# Patient Record
Sex: MALE | Race: WHITE | NOT HISPANIC OR LATINO | Employment: UNEMPLOYED | URBAN - NONMETROPOLITAN AREA
[De-identification: names, ages, dates, MRNs, and addresses within clinical notes are randomized per-mention and may not be internally consistent; named-entity substitution may affect disease eponyms.]

---

## 2017-01-08 ENCOUNTER — APPOINTMENT (OUTPATIENT)
Dept: CT IMAGING | Facility: HOSPITAL | Age: 52
End: 2017-01-08

## 2017-01-08 ENCOUNTER — HOSPITAL ENCOUNTER (EMERGENCY)
Facility: HOSPITAL | Age: 52
Discharge: HOME | End: 2017-01-08
Admitting: EMERGENCY MEDICINE

## 2017-01-08 ENCOUNTER — APPOINTMENT (OUTPATIENT)
Dept: GENERAL RADIOLOGY | Facility: HOSPITAL | Age: 52
End: 2017-01-08

## 2017-01-08 VITALS
OXYGEN SATURATION: 96 % | SYSTOLIC BLOOD PRESSURE: 101 MMHG | HEART RATE: 64 BPM | BODY MASS INDEX: 30.12 KG/M2 | HEIGHT: 63 IN | DIASTOLIC BLOOD PRESSURE: 51 MMHG | WEIGHT: 170 LBS | TEMPERATURE: 97 F | RESPIRATION RATE: 23 BRPM

## 2017-01-08 DIAGNOSIS — R42 DIZZINESS: Primary | ICD-10-CM

## 2017-01-08 DIAGNOSIS — G23.8 FAHR'S SYNDROME (HCC): ICD-10-CM

## 2017-01-08 LAB
ALBUMIN SERPL-MCNC: 4 G/DL (ref 3.5–5)
ALBUMIN/GLOB SERPL: 1.3 G/DL (ref 1.1–2.5)
ALP SERPL-CCNC: 87 U/L (ref 24–120)
ALT SERPL W P-5'-P-CCNC: 85 U/L (ref 0–54)
AMYLASE SERPL-CCNC: 54 U/L (ref 30–110)
ANION GAP SERPL CALCULATED.3IONS-SCNC: 14 MMOL/L (ref 4–13)
APTT PPP: 29.1 SECONDS (ref 24.1–34.8)
AST SERPL-CCNC: 51 U/L (ref 7–45)
BASOPHILS # BLD AUTO: 0.02 10*3/MM3 (ref 0–0.2)
BASOPHILS NFR BLD AUTO: 0.4 % (ref 0–2)
BILIRUB SERPL-MCNC: 0.6 MG/DL (ref 0.1–1)
BILIRUB UR QL STRIP: NEGATIVE
BUN BLD-MCNC: 14 MG/DL (ref 5–21)
BUN/CREAT SERPL: 17.5 (ref 7–25)
CALCIUM SPEC-SCNC: 7.9 MG/DL (ref 8.4–10.4)
CHLORIDE SERPL-SCNC: 102 MMOL/L (ref 98–110)
CLARITY UR: CLEAR
CO2 SERPL-SCNC: 26 MMOL/L (ref 24–31)
COLOR UR: YELLOW
CREAT BLD-MCNC: 0.8 MG/DL (ref 0.5–1.4)
DEPRECATED RDW RBC AUTO: 42.3 FL (ref 40–54)
EOSINOPHIL # BLD AUTO: 0.11 10*3/MM3 (ref 0–0.7)
EOSINOPHIL NFR BLD AUTO: 2.4 % (ref 0–4)
ERYTHROCYTE [DISTWIDTH] IN BLOOD BY AUTOMATED COUNT: 12.8 % (ref 12–15)
GFR SERPL CREATININE-BSD FRML MDRD: 103 ML/MIN/1.73
GLOBULIN UR ELPH-MCNC: 3.2 GM/DL
GLUCOSE BLD-MCNC: 87 MG/DL (ref 70–100)
GLUCOSE UR STRIP-MCNC: NEGATIVE MG/DL
HCT VFR BLD AUTO: 43.3 % (ref 40–52)
HGB BLD-MCNC: 15.2 G/DL (ref 14–18)
HGB UR QL STRIP.AUTO: NEGATIVE
HOLD SPECIMEN: NORMAL
HOLD SPECIMEN: NORMAL
IMM GRANULOCYTES # BLD: 0.01 10*3/MM3 (ref 0–0.03)
IMM GRANULOCYTES NFR BLD: 0.2 % (ref 0–5)
INR PPP: 0.9 (ref 0.91–1.09)
KETONES UR QL STRIP: NEGATIVE
LEUKOCYTE ESTERASE UR QL STRIP.AUTO: NEGATIVE
LIPASE SERPL-CCNC: 43 U/L (ref 23–203)
LYMPHOCYTES # BLD AUTO: 1.6 10*3/MM3 (ref 0.72–4.86)
LYMPHOCYTES NFR BLD AUTO: 35 % (ref 15–45)
MCH RBC QN AUTO: 31.8 PG (ref 28–32)
MCHC RBC AUTO-ENTMCNC: 35.1 G/DL (ref 33–36)
MCV RBC AUTO: 90.6 FL (ref 82–95)
MONOCYTES # BLD AUTO: 0.58 10*3/MM3 (ref 0.19–1.3)
MONOCYTES NFR BLD AUTO: 12.7 % (ref 4–12)
NEUTROPHILS # BLD AUTO: 2.25 10*3/MM3 (ref 1.87–8.4)
NEUTROPHILS NFR BLD AUTO: 49.3 % (ref 39–78)
NITRITE UR QL STRIP: NEGATIVE
PH UR STRIP.AUTO: 7 [PH] (ref 5–8)
PLATELET # BLD AUTO: 146 10*3/MM3 (ref 130–400)
PMV BLD AUTO: 11.8 FL (ref 6–12)
POTASSIUM BLD-SCNC: 3.7 MMOL/L (ref 3.5–5.3)
PROT SERPL-MCNC: 7.2 G/DL (ref 6.3–8.7)
PROT UR QL STRIP: NEGATIVE
PROTHROMBIN TIME: 12.4 SECONDS (ref 11.9–14.6)
RBC # BLD AUTO: 4.78 10*6/MM3 (ref 4.8–5.9)
SODIUM BLD-SCNC: 142 MMOL/L (ref 135–145)
SP GR UR STRIP: 1.01 (ref 1–1.03)
TROPONIN I SERPL-MCNC: 0 NG/ML (ref 0–0.07)
UROBILINOGEN UR QL STRIP: NORMAL
WBC NRBC COR # BLD: 4.57 10*3/MM3 (ref 4.8–10.8)
WHOLE BLOOD HOLD SPECIMEN: NORMAL
WHOLE BLOOD HOLD SPECIMEN: NORMAL

## 2017-01-08 PROCEDURE — 84484 ASSAY OF TROPONIN QUANT: CPT

## 2017-01-08 PROCEDURE — 80053 COMPREHEN METABOLIC PANEL: CPT | Performed by: NURSE PRACTITIONER

## 2017-01-08 PROCEDURE — 93005 ELECTROCARDIOGRAM TRACING: CPT | Performed by: NURSE PRACTITIONER

## 2017-01-08 PROCEDURE — 81003 URINALYSIS AUTO W/O SCOPE: CPT | Performed by: NURSE PRACTITIONER

## 2017-01-08 PROCEDURE — 96376 TX/PRO/DX INJ SAME DRUG ADON: CPT

## 2017-01-08 PROCEDURE — 96375 TX/PRO/DX INJ NEW DRUG ADDON: CPT

## 2017-01-08 PROCEDURE — 93010 ELECTROCARDIOGRAM REPORT: CPT | Performed by: INTERNAL MEDICINE

## 2017-01-08 PROCEDURE — 85025 COMPLETE CBC W/AUTO DIFF WBC: CPT | Performed by: NURSE PRACTITIONER

## 2017-01-08 PROCEDURE — 25010000002 METHYLPREDNISOLONE PER 125 MG: Performed by: NURSE PRACTITIONER

## 2017-01-08 PROCEDURE — 99284 EMERGENCY DEPT VISIT MOD MDM: CPT

## 2017-01-08 PROCEDURE — 96361 HYDRATE IV INFUSION ADD-ON: CPT

## 2017-01-08 PROCEDURE — 70450 CT HEAD/BRAIN W/O DYE: CPT

## 2017-01-08 PROCEDURE — 85730 THROMBOPLASTIN TIME PARTIAL: CPT | Performed by: NURSE PRACTITIONER

## 2017-01-08 PROCEDURE — 96374 THER/PROPH/DIAG INJ IV PUSH: CPT

## 2017-01-08 PROCEDURE — 82150 ASSAY OF AMYLASE: CPT | Performed by: NURSE PRACTITIONER

## 2017-01-08 PROCEDURE — 25010000002 ONDANSETRON PER 1 MG: Performed by: NURSE PRACTITIONER

## 2017-01-08 PROCEDURE — 85610 PROTHROMBIN TIME: CPT | Performed by: NURSE PRACTITIONER

## 2017-01-08 PROCEDURE — 83690 ASSAY OF LIPASE: CPT | Performed by: NURSE PRACTITIONER

## 2017-01-08 PROCEDURE — 71010 HC CHEST PA OR AP: CPT

## 2017-01-08 RX ORDER — ONDANSETRON 2 MG/ML
4 INJECTION INTRAMUSCULAR; INTRAVENOUS ONCE
Status: COMPLETED | OUTPATIENT
Start: 2017-01-08 | End: 2017-01-08

## 2017-01-08 RX ORDER — METHYLPREDNISOLONE SODIUM SUCCINATE 125 MG/2ML
125 INJECTION, POWDER, LYOPHILIZED, FOR SOLUTION INTRAMUSCULAR; INTRAVENOUS ONCE
Status: COMPLETED | OUTPATIENT
Start: 2017-01-08 | End: 2017-01-08

## 2017-01-08 RX ORDER — CALCITRIOL 0.25 UG/1
CAPSULE, LIQUID FILLED ORAL
COMMUNITY

## 2017-01-08 RX ORDER — ASPIRIN 81 MG/1
TABLET ORAL
COMMUNITY
Start: 2016-06-29 | End: 2017-03-29

## 2017-01-08 RX ORDER — MECLIZINE HYDROCHLORIDE 25 MG/1
25 TABLET ORAL ONCE
Status: COMPLETED | OUTPATIENT
Start: 2017-01-08 | End: 2017-01-08

## 2017-01-08 RX ORDER — OMEPRAZOLE 20 MG/1
CAPSULE, DELAYED RELEASE ORAL
COMMUNITY

## 2017-01-08 RX ORDER — CHLORDIAZEPOXIDE HYDROCHLORIDE 25 MG/1
25 CAPSULE, GELATIN COATED ORAL ONCE
Status: DISCONTINUED | OUTPATIENT
Start: 2017-01-08 | End: 2017-01-08

## 2017-01-08 RX ADMIN — ONDANSETRON HYDROCHLORIDE 4 MG: 2 SOLUTION INTRAMUSCULAR; INTRAVENOUS at 12:27

## 2017-01-08 RX ADMIN — ONDANSETRON HYDROCHLORIDE 4 MG: 2 SOLUTION INTRAMUSCULAR; INTRAVENOUS at 10:52

## 2017-01-08 RX ADMIN — MECLIZINE HYDROCHLORIDE 25 MG: 25 TABLET ORAL at 12:27

## 2017-01-08 RX ADMIN — SODIUM CHLORIDE 1000 ML: 9 INJECTION, SOLUTION INTRAVENOUS at 10:51

## 2017-01-08 RX ADMIN — METHYLPREDNISOLONE SODIUM SUCCINATE 125 MG: 125 INJECTION, POWDER, FOR SOLUTION INTRAMUSCULAR; INTRAVENOUS at 12:25

## 2017-01-08 NOTE — ED NOTES
Pt reports that he becomes more dizzy and nauseated with any movement- especially when going from lying to sitting and standing to sitting.      Bernardino Desouza RN  01/08/17 1559

## 2017-01-08 NOTE — ED PROVIDER NOTES
Subjective   HPI Comments: She is a 51-year-old white male presents with nausea, vomiting, and dizziness for the past 3 days.  He denies any headache or chest pain.  He states that the last time he vomited was just pta    Patient is a 51 y.o. male presenting with dizziness.   History provided by:  Patient   used: No    Dizziness       Review of Systems   Constitutional: Negative.    HENT: Negative.    Eyes: Negative.    Respiratory: Negative.    Cardiovascular: Negative.    Gastrointestinal: Negative.    Endocrine: Negative.    Genitourinary: Negative.    Musculoskeletal: Negative.    Skin: Negative.    Allergic/Immunologic: Negative.    Neurological: Positive for dizziness.        Pt states that he has had dizziness with nausea and vomiting for the past 3 days. He denies headache. He denies chest pain. No fever or chills. He states that the dizziness is not worsened with movement of head. Denies any numbness or focal weakness   Hematological: Negative.    Psychiatric/Behavioral: Negative.    All other systems reviewed and are negative.      Past Medical History   Diagnosis Date   • Fahr's disease    • GERD (gastroesophageal reflux disease)        Allergies   Allergen Reactions   • Bactrim [Sulfamethoxazole-Trimethoprim]        Past Surgical History   Procedure Laterality Date   • Foot surgery         History reviewed. No pertinent family history.    Social History     Social History   • Marital status: Single     Spouse name: N/A   • Number of children: N/A   • Years of education: N/A     Social History Main Topics   • Smoking status: Current Some Day Smoker     Types: Cigars   • Smokeless tobacco: None   • Alcohol use No   • Drug use: No   • Sexual activity: Not Asked     Other Topics Concern   • None     Social History Narrative   • None       Prior to Admission medications    Medication Sig Start Date End Date Taking? Authorizing Provider   aspirin 81 MG EC tablet Take 1 tablet by mouth daily  "6/29/16  Yes Historical Provider, MD   calcitriol (ROCALTROL) 0.25 MCG capsule Take 0.25 mcg by mouth three times daily     Historical Provider, MD   omeprazole (priLOSEC) 20 MG capsule Take 20 mg by mouth daily    Historical Provider, MD       Visit Vitals   • /51 (BP Location: Left arm, Patient Position: Lying)   • Pulse 64   • Temp 97 °F (36.1 °C) (Oral)   • Resp 23   • Ht 63\" (160 cm)   • Wt 170 lb (77.1 kg)   • SpO2 96%   • BMI 30.11 kg/m2       Objective   Physical Exam   Constitutional: He is oriented to person, place, and time. He appears well-developed and well-nourished.   HENT:   Head: Normocephalic and atraumatic.   Right Ear: External ear normal.   Left Ear: External ear normal.   Nose: Nose normal.   Mouth/Throat: Oropharynx is clear and moist.   Eyes: Conjunctivae and EOM are normal. Pupils are equal, round, and reactive to light.   Neck: Normal range of motion. Neck supple.   Cardiovascular: Normal rate, regular rhythm, normal heart sounds and intact distal pulses.    Pulmonary/Chest: Effort normal and breath sounds normal.   Abdominal: Soft. Bowel sounds are normal.   Musculoskeletal: Normal range of motion.   Neurological: He is alert and oriented to person, place, and time. He has normal reflexes.   Mild nystagmus with vertical gaze to the right. Has involuntary movements to upper extremities. Pt states that this is from fahrs disease    Skin: Skin is warm and dry.   Psychiatric: He has a normal mood and affect. His behavior is normal. Judgment and thought content normal.   Nursing note and vitals reviewed.      Procedures         Lab Results (last 24 hours)     Procedure Component Value Units Date/Time    CBC & Differential [[id number]] Collected:  01/08/17 1051    Specimen:  Blood Updated:  01/08/17 1114    Narrative:       The following orders were created for panel order CBC & Differential.  Procedure                               Abnormality         Status                     ---------    "                            -----------         ------                     Scan Slide[[id number]]                                                                   CBC Auto Differential[[id number]]         Abnormal            Final result                 Please view results for these tests on the individual orders.    Comprehensive Metabolic Panel [[id number]]  (Abnormal) Collected:  01/08/17 1051    Specimen:  Blood Updated:  01/08/17 1114     Glucose 87 mg/dL      BUN 14 mg/dL      Creatinine 0.80 mg/dL      Sodium 142 mmol/L      Potassium 3.7 mmol/L      Chloride 102 mmol/L      CO2 26.0 mmol/L      Calcium 7.9 (L) mg/dL      Total Protein 7.2 g/dL      Albumin 4.00 g/dL      ALT (SGPT) 85 (H) U/L      AST (SGOT) 51 (H) U/L      Alkaline Phosphatase 87 U/L      Total Bilirubin 0.6 mg/dL      eGFR Non African Amer 103 mL/min/1.73      Globulin 3.2 gm/dL      A/G Ratio 1.3 g/dL      BUN/Creatinine Ratio 17.5      Anion Gap 14.0 (H) mmol/L     Protime-INR [[id number]]  (Abnormal) Collected:  01/08/17 1051    Specimen:  Blood Updated:  01/08/17 1112     Protime 12.4 Seconds      INR 0.90 (L)     aPTT [[id number]]  (Normal) Collected:  01/08/17 1051    Specimen:  Blood Updated:  01/08/17 1112     PTT 29.1 seconds     Amylase [[id number]]  (Normal) Collected:  01/08/17 1051    Specimen:  Blood Updated:  01/08/17 1114     Amylase 54 U/L     Lipase [[id number]]  (Normal) Collected:  01/08/17 1051    Specimen:  Blood Updated:  01/08/17 1114     Lipase 43 U/L     CBC Auto Differential [[id number]]  (Abnormal) Collected:  01/08/17 1051    Specimen:  Blood Updated:  01/08/17 1114     WBC 4.57 (L) 10*3/mm3      RBC 4.78 (L) 10*6/mm3      Hemoglobin 15.2 g/dL      Hematocrit 43.3 %      MCV 90.6 fL      MCH 31.8 pg      MCHC 35.1 g/dL      RDW 12.8 %      RDW-SD 42.3 fl      MPV 11.8 fL      Platelets 146 10*3/mm3      Neutrophil % 49.3 %      Lymphocyte % 35.0 %      Monocyte % 12.7 (H) %      Eosinophil % 2.4 %      Basophil % 0.4 %       Immature Grans % 0.2 %      Neutrophils, Absolute 2.25 10*3/mm3      Lymphocytes, Absolute 1.60 10*3/mm3      Monocytes, Absolute 0.58 10*3/mm3      Eosinophils, Absolute 0.11 10*3/mm3      Basophils, Absolute 0.02 10*3/mm3      Immature Grans, Absolute 0.01 10*3/mm3     POC Troponin, Rapid [[id number]]  (Normal) Collected:  01/08/17 1100    Specimen:  Blood Updated:  01/08/17 1115     Troponin I 0.00 ng/mL       Serial Number: [id number]    : [id number]       Urinalysis With / Culture If Indicated [[id number]]  (Normal) Collected:  01/08/17 1127    Specimen:  Urine from Urine, Clean Catch Updated:  01/08/17 1218     Color, UA Yellow      Appearance, UA Clear      pH, UA 7.0      Specific Gravity, UA 1.013      Glucose, UA Negative      Ketones, UA Negative      Bilirubin, UA Negative      Blood, UA Negative      Protein, UA Negative      Leuk Esterase, UA Negative      Nitrite, UA Negative      Urobilinogen, UA 1.0 E.U./dL     Narrative:       Urine microscopic not indicated.          CT Head Without Contrast   ED Interpretation   1. No acute intracranial process.   2. Marked cerebral and cerebellar calcifications in a fairly symmetric   pattern. While this may be related to prior insult or infection,   consider  metabolic causes such as hypoparathyroidism,   pseudohypoparathyroidism, and Fahr disease. Clinical correlation   recommended.           This report was finalized on 01/08/2017 11:26 by Dr. Elias Coy MD.      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   This report was finalized on 01/08/2017 11:59 by Dr. Elias Coy MD.      Final   1. No acute intracranial process.   2. Marked cerebral and cerebellar calcifications in a fairly symmetric   pattern. While this may be related to prior insult or infection,   consider  metabolic causes such as hypoparathyroidism,   pseudohypoparathyroidism, and Fahr disease. Clinical correlation   recommended.           This  report was finalized on 01/08/2017 11:26 by Dr. Elias Coy MD.      XR Chest 1 View   ED Interpretation   Impression: No acute findings.   This report was finalized on 01/08/2017 10:38 by Dr. Elias Coy MD.      Final Result   Impression: No acute findings.   This report was finalized on 01/08/2017 10:38 by Dr. Elias Coy MD.          ED Course  ED Course   Comment By Time   Reviewed pt and pt care plan with dr carty- also assessed pt and in agreement with pt care plan. Dr carty advised pt that he needs to follow up with neurology for further. Pt states that he is feeling some better at this time. Will be discharged home shortly in stable condition  Ritu Garcia, APRN 01/08 1302          MDM  Number of Diagnoses or Management Options  Dizziness: minor  Fahr's syndrome: established and worsening     Amount and/or Complexity of Data Reviewed  Clinical lab tests: ordered and reviewed  Tests in the radiology section of CPT®: ordered and reviewed  Independent visualization of images, tracings, or specimens: yes    Risk of Complications, Morbidity, and/or Mortality  Presenting problems: minimal  Diagnostic procedures: minimal  Management options: minimal    Patient Progress  Patient progress: stable      Final diagnoses:   Dizziness   Fahr's syndrome          Ritu Garcia, SKYE  01/08/17 6610

## 2017-03-24 ENCOUNTER — HOSPITAL ENCOUNTER (EMERGENCY)
Facility: HOSPITAL | Age: 52
Discharge: LEFT BEFORE DISCHARGE | End: 2017-03-24
Payer: COMMERCIAL

## 2017-03-29 ENCOUNTER — OFFICE VISIT (OUTPATIENT)
Dept: PODIATRY | Facility: CLINIC | Age: 52
End: 2017-03-29

## 2017-03-29 VITALS
BODY MASS INDEX: 27.83 KG/M2 | WEIGHT: 163 LBS | DIASTOLIC BLOOD PRESSURE: 78 MMHG | HEART RATE: 78 BPM | HEIGHT: 64 IN | SYSTOLIC BLOOD PRESSURE: 118 MMHG

## 2017-03-29 DIAGNOSIS — M79.671 FOOT PAIN, RIGHT: ICD-10-CM

## 2017-03-29 DIAGNOSIS — L84 CALLUS OF FOOT: ICD-10-CM

## 2017-03-29 DIAGNOSIS — L60.0 INGROWN RIGHT BIG TOENAIL: Primary | ICD-10-CM

## 2017-03-29 PROCEDURE — 99203 OFFICE O/P NEW LOW 30 MIN: CPT | Performed by: PODIATRIST

## 2017-03-29 PROCEDURE — 11720 DEBRIDE NAIL 1-5: CPT | Performed by: PODIATRIST

## 2017-03-29 PROCEDURE — 11055 PARING/CUTG B9 HYPRKER LES 1: CPT | Performed by: PODIATRIST

## 2017-03-29 NOTE — PROGRESS NOTES
[facility] - PODIATRY    Today's Date: 03/29/2017    Patient Name: Jaime Taveras  MRN: [id number]  CSN: [id number]  PCP: Juan J Mccormick Jr., MD  Referring Provider: No ref. provider found    SUBJECTIVE     Chief Complaint   Patient presents with   • Nail Problem     Ingrown Nail R great toe/Ref Kareem Weems APRN/antibiotic given for this makes him sick so he isn't taking it     HPI: Jaime Taveras, a 51 y.o.male, comes to clinic as a(n) new patient complaining of ingrown toenail and painful callus. States that 6-9 months ago he stubbed his toe while getting up in the middle of the night. Since that time the toenail became dark and loose but never came off. The past couple of weeks it has become more painful and noticed occasional drainage from the nail. He was given abx but did not take them bc they upset his stomach. Admits pain at 7/10 level, described as aching and throbbing and centered around right great toe. Also relates having a painful callus on his right 5th toe. Denies any constitutional symptoms. No other pedal complaints at this time.    Past Medical History:   Diagnosis Date   • CAD (coronary artery disease)    • COPD (chronic obstructive pulmonary disease)    • Degenerative joint disease    • Fahr's disease    • GERD (gastroesophageal reflux disease)    • Hepatitis C    • Infectious viral hepatitis     Hepatitis C   • Pseudohypoparathyroidism    • Psoriasis      Past Surgical History:   Procedure Laterality Date   • FOOT SURGERY     • FRACTURE SURGERY Right     foot     Family History   Problem Relation Age of Onset   • Alcohol abuse Mother    • Cirrhosis Father    • Hepatitis Father    • Alcohol abuse Father    • Alcohol abuse Other      Social History     Social History   • Marital status: Single     Spouse name: N/A   • Number of children: N/A   • Years of education: N/A     Occupational History   • Not on file.     Social History Main Topics   • Smoking status: Current  Some Day Smoker     Types: Cigars   • Smokeless tobacco: Former User   • Alcohol use No      Comment: Sober for 18 months   • Drug use: No   • Sexual activity: Defer     Other Topics Concern   • Not on file     Social History Narrative     Allergies   Allergen Reactions   • Bactrim [Sulfamethoxazole-Trimethoprim]    • Cephalexin      Nausea/Diarrhea     Current Outpatient Prescriptions   Medication Sig Dispense Refill   • calcitriol (ROCALTROL) 0.25 MCG capsule Take 0.25 mcg by mouth three times daily      • calcium carbonate (TUMS) 500 MG chewable tablet Chew 1 tablet Daily.     • clobetasol (TEMOVATE) 0.05 % ointment Apply  topically 2 (Two) Times a Day.     • diclofenac (VOLTAREN) 50 MG EC tablet      • ibuprofen (ADVIL,MOTRIN) 800 MG tablet      • omeprazole (priLOSEC) 20 MG capsule Take 20 mg by mouth daily     • VENTOLIN  (90 BASE) MCG/ACT inhaler        No current facility-administered medications for this visit.      Review of Systems   Constitutional: Negative for chills and fever.   HENT: Negative for congestion.    Respiratory: Negative for shortness of breath.    Cardiovascular: Negative for chest pain and leg swelling.   Gastrointestinal: Negative for constipation, diarrhea, nausea and vomiting.   Skin:        Ingrown toenail   Neurological: Negative for numbness.       OBJECTIVE     Vitals:    03/29/17 1450   BP: 118/78   Pulse: 78       PHYSICAL EXAM  GEN:   A&Ox3, NAD. Pt presents to clinic ambulating without assistance and wearing Casual Shoes.      NEURO:   Protective sensation intact to 10/10 sites Right foot, 10/10 site Left foot using [place] monofilament  Light touch sensation present  No Tinel's or Villeux sign.    VASC:  Skin temperature Warm to Warm proximal to distal shayla  DP pulses 2/4 Right, 2/4 Left  PT pulses 2/4 Right, 2/4 Left  CFT <3 sec shayla  Pedal hair growth present  no edema noted shayla  Varicosities absent shayla    MUSC/SKEL:  Muscle Strength Right foot Dorsiflexors  5/5, Plantarflexors 5/5, Evertors 5/5, Invertors 5/5  Muscle Strength Left foot Dorsiflexors 5/5, Plantarflexors 5/5, Evertors 5/5, Invertors 5/5  ROM of the 1st MTP is full without pain or crepitus  ROM of the MTJ is full without pain or crepitus    ROM of the STJ is full without pain or crepitus    ROM of the ankle joint is full without pain or crepitus    POP of right hallux and distal right 5th toe  Rectus foot type   Gait pattern: Normal  No gross pedal musculoskeletal deformities noted.     DERM:  Pedal nails x10 are thickened, elongated and discolored with presence of subunugual debris and right hallux nail incurvated at the lateral border, offending the nail fold and causing erythema and pain with pinpoint wound, and becoming detached from the nail bed  Webspaces are Clean, Dry, and Intact  Skin is normal in turgor and texture with no open wounds or sores appreciated.  Nucleated HPK noted to distal lateral plantar aspect of right 5th digit      RADIOLOGY/NUCLEAR:  No results found.    LABORATORY/CULTURE RESULTS:      PATHOLOGY RESULTS:       ASSESSMENT/PLAN     Jaime was seen today for nail problem.    Diagnoses and all orders for this visit:    Ingrown right big toenail    Foot pain, right    Callus of foot      Comprehensive lower extremity examination and evaluation was performed.  Discussed findings and treatment plan including risks, benefits, and treatment options with patient in detail. Patient agreed with treatment plan.  After verbal consent obtained, nail(s) x1 debrided of offending borders with nail nipper without incidence, After verbal consent obtained, calluses x1 pared utilizing dermal curette and/or scalpel without incidence, Patient may maintain nails and calluses at home utilizing emery board of pumice stone between visits as needed.  Applied polysporin and bandaid   An After Visit Summary was printed and given to the patient at discharge, including (if requested) any available  informative/educational handouts regarding diagnosis, treatment, or medications. All questions were answered to patient/family satisfaction. Should symptoms fail to improve or worsen they agree to call or return to clinic or to go to the Emergency Department. Discussed the importance of following up with any needed screening tests/labs/specialist appointments and any requested follow-up recommended by me today. Importance of maintaining follow-up discussed and patient accepts that missed appointments can delay diagnosis and potentially lead to worsening of conditions.  Return if symptoms worsen or fail to improve., or sooner if acute issues arise.        This document has been electronically signed by Donte Sandra DPM on March 29, 2017 3:44 PM     Please note that portions of this note may have been completed with a voice recognition program. Efforts were made to edit the dictations, but occasionally words are mistranscribed.

## 2017-03-29 NOTE — PATIENT INSTRUCTIONS
Corns and Calluses  Corns are small areas of thickened skin that occur on the top, sides, or tip of a toe. They contain a cone-shaped core with a point that can press on a nerve below. This causes pain. Calluses are areas of thickened skin that can occur anywhere on the body including hands, fingers, palms, soles of the feet, and heels. Calluses are usually larger than corns.   CAUSES   Corns and calluses are caused by rubbing (friction) or pressure, such as from shoes that are too tight or do not fit properly.   RISK FACTORS  Corns are more likely to develop in people who have toe deformities, such as hammer toes.  Since calluses can occur with friction to any area of the skin, calluses are more likely to develop in people who:   · Work with their hands.  · Wear shoes that fit poorly, shoes that are too tight, or shoes that are high-heeled.  · Have toes deformities.  SYMPTOMS  Symptoms of a corn or callus include:  · A hard growth on the skin.    · Pain or tenderness under the skin.    · Redness and swelling.    · Increased discomfort while wearing tight-fitting shoes.  DIAGNOSIS   Corns and calluses may be diagnosed with a medical history and physical exam.   TREATMENT   Corns and calluses may be treated with:  · Removing the cause of the friction or pressure. This may include:    Changing your shoes.    Wearing shoe inserts (orthotics) or other protective layers in your shoes, such as a corn pad.    Wearing gloves.  · Medicines to help soften skin in the hardened, thickened areas.  · Reducing the size of the corn or callus by removing the dead layers of skin.  · Antibiotic medicines to treat infection.  · Surgery, if a toe deformity is the cause.  HOME CARE INSTRUCTIONS   · Take medicines only as directed by your health care provider.  · If you were prescribed an antibiotic, finish all of it even if you start to feel better.  · Wear shoes that fit well. Avoid wearing high-heeled shoes and shoes that are too tight  or too loose.  · Wear any padding, protective layers, gloves, or orthotics as directed by your health care provider.  · Soak your hands or feet and then use a file or pumice stone to soften your corn or callus. Do this as directed by your health care provider.  · Check your corn or callus every day for signs of infection. Watch for:    Redness, swelling, or pain.    Fluid, blood, or pus.  SEEK MEDICAL CARE IF:   · Your symptoms do not improve with treatment.  · You have increased redness, swelling, or pain at the site of your corn or callus.  · You have fluid, blood, or pus coming from your corn or callus.  · You have new symptoms.     This information is not intended to replace advice given to you by your health care provider. Make sure you discuss any questions you have with your health care provider.     Document Released: 09/23/2005 Document Revised: 05/03/2016 Document Reviewed: 12/14/2015  [employer] Interactive Patient Education ©2016 [employer]  Ingrown Toenail  An ingrown toenail occurs when the corner or sides of your toenail grow into the surrounding skin. The big toe is most commonly affected, but it can happen to any of your toes. If your ingrown toenail is not treated, you will be at risk for infection.  CAUSES  This condition may be caused by:  · Wearing shoes that are too small or tight.  · Injury or trauma, such as stubbing your toe or having your toe stepped on.  · Improper cutting or care of your toenails.  · Being born with (congenital) nail or foot abnormalities, such as having a nail that is too big for your toe.  RISK FACTORS  Risk factors for an ingrown toenail include:  · Age. Your nails tend to thicken as you get older, so ingrown nails are more common in older people.  · Diabetes.  · Cutting your toenails incorrectly.  · Blood circulation problems.  SYMPTOMS  Symptoms may include:  · Pain, soreness, or tenderness.  · Redness.  · Swelling.  · Hardening of the skin surrounding the  toe.  Your ingrown toenail may be infected if there is fluid, pus, or drainage.  DIAGNOSIS   An ingrown toenail may be diagnosed by medical history and physical exam. If your toenail is infected, your health care provider may test a sample of the drainage.  TREATMENT  Treatment depends on the severity of your ingrown toenail. Some ingrown toenails may be treated at home. More severe or infected ingrown toenails may require surgery to remove all or part of the nail. Infected ingrown toenails may also be treated with antibiotic medicines.  HOME CARE INSTRUCTIONS  · If you were prescribed an antibiotic medicine, finish all of it even if you start to feel better.  · Soak your foot in warm soapy water for 20 minutes, 3 times per day or as directed by your health care provider.  · Carefully lift the edge of the nail away from the sore skin by wedging a small piece of cotton under the corner of the nail. This may help with the pain.  Be careful not to cause more injury to the area.  · Wear shoes that fit well. If your ingrown toenail is causing you pain, try wearing sandals, if possible.  · Trim your toenails regularly and carefully. Do not cut them in a curved shape. Cut your toenails straight across. This prevents injury to the skin at the corners of the toenail.  · Keep your feet clean and dry.  · If you are having trouble walking and are given crutches by your health care provider, use them as directed.  · Do not pick at your toenail or try to remove it yourself.  · Take medicines only as directed by your health care provider.  · Keep all follow-up visits as directed by your health care provider. This is important.  SEEK MEDICAL CARE IF:  · Your symptoms do not improve with treatment.  SEEK IMMEDIATE MEDICAL CARE IF:  · You have red streaks that start at your foot and go up your leg.  · You have a fever.  · You have increased redness, swelling, or pain.  · You have fluid, blood, or pus coming from your toenail.      This information is not intended to replace advice given to you by your health care provider. Make sure you discuss any questions you have with your health care provider.     Document Released: 12/15/2001 Document Revised: 05/03/2016 Document Reviewed: 11/11/2015  [employer] Interactive Patient Education ©2016 [employer]

## 2017-07-23 ENCOUNTER — APPOINTMENT (OUTPATIENT)
Dept: GENERAL RADIOLOGY | Facility: HOSPITAL | Age: 52
End: 2017-07-23

## 2017-07-23 ENCOUNTER — HOSPITAL ENCOUNTER (EMERGENCY)
Facility: HOSPITAL | Age: 52
Discharge: HOME | End: 2017-07-23
Admitting: FAMILY MEDICINE

## 2017-07-23 VITALS
SYSTOLIC BLOOD PRESSURE: 117 MMHG | WEIGHT: 165 LBS | DIASTOLIC BLOOD PRESSURE: 78 MMHG | OXYGEN SATURATION: 97 % | HEIGHT: 63 IN | TEMPERATURE: 98 F | RESPIRATION RATE: 16 BRPM | HEART RATE: 77 BPM | BODY MASS INDEX: 29.23 KG/M2

## 2017-07-23 DIAGNOSIS — S62.101A RIGHT WRIST FRACTURE, CLOSED, INITIAL ENCOUNTER: Primary | ICD-10-CM

## 2017-07-23 PROCEDURE — 73130 X-RAY EXAM OF HAND: CPT

## 2017-07-23 PROCEDURE — 73090 X-RAY EXAM OF FOREARM: CPT

## 2017-07-23 PROCEDURE — 99283 EMERGENCY DEPT VISIT LOW MDM: CPT

## 2017-07-23 PROCEDURE — 73110 X-RAY EXAM OF WRIST: CPT

## 2017-07-23 RX ORDER — MELOXICAM 7.5 MG/1
7.5 TABLET ORAL DAILY
Qty: 14 TABLET | Refills: 0 | Status: SHIPPED | OUTPATIENT
Start: 2017-07-23 | End: 2021-08-24
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
## 2017-08-16 RX ORDER — ONDANSETRON 4 MG/1
4 TABLET, FILM COATED ORAL EVERY 8 HOURS PRN
COMMUNITY
End: 2021-08-24

## 2018-04-26 ENCOUNTER — APPOINTMENT (OUTPATIENT)
Dept: GENERAL RADIOLOGY | Facility: HOSPITAL | Age: 53
End: 2018-04-26

## 2018-04-26 ENCOUNTER — HOSPITAL ENCOUNTER (EMERGENCY)
Facility: HOSPITAL | Age: 53
Discharge: HOME | End: 2018-04-26
Admitting: EMERGENCY MEDICINE

## 2018-04-26 VITALS
SYSTOLIC BLOOD PRESSURE: 141 MMHG | HEIGHT: 63 IN | BODY MASS INDEX: 28.35 KG/M2 | OXYGEN SATURATION: 97 % | WEIGHT: 160 LBS | TEMPERATURE: 98.4 F | DIASTOLIC BLOOD PRESSURE: 76 MMHG | HEART RATE: 97 BPM | RESPIRATION RATE: 18 BRPM

## 2018-04-26 DIAGNOSIS — S62.102A CLOSED FRACTURE OF LEFT WRIST, INITIAL ENCOUNTER: Primary | ICD-10-CM

## 2018-04-26 PROCEDURE — 73110 X-RAY EXAM OF WRIST: CPT

## 2018-04-26 PROCEDURE — 99283 EMERGENCY DEPT VISIT LOW MDM: CPT

## 2018-04-26 RX ORDER — OXYCODONE AND ACETAMINOPHEN 7.5; 325 MG/1; MG/1
1 TABLET ORAL EVERY 4 HOURS PRN
Qty: 15 TABLET | Refills: 0 | Status: SHIPPED | OUTPATIENT
Start: 2018-04-26 | End: 2021-08-24

## 2018-05-01 ENCOUNTER — APPOINTMENT (OUTPATIENT)
Dept: GENERAL RADIOLOGY | Age: 53
End: 2018-05-01
Attending: ORTHOPAEDIC SURGERY
Payer: MEDICAID

## 2018-05-01 ENCOUNTER — HOSPITAL ENCOUNTER (OUTPATIENT)
Age: 53
Setting detail: OUTPATIENT SURGERY
Discharge: HOME | End: 2018-05-01
Attending: ORTHOPAEDIC SURGERY | Admitting: ORTHOPAEDIC SURGERY
Payer: MEDICAID

## 2018-05-01 ENCOUNTER — ANESTHESIA (OUTPATIENT)
Dept: OPERATING ROOM | Age: 53
End: 2018-05-01
Payer: MEDICAID

## 2018-05-01 ENCOUNTER — ANESTHESIA EVENT (OUTPATIENT)
Dept: OPERATING ROOM | Age: 53
End: 2018-05-01
Payer: MEDICAID

## 2018-05-01 VITALS
RESPIRATION RATE: 16 BRPM | DIASTOLIC BLOOD PRESSURE: 87 MMHG | HEIGHT: 64 IN | OXYGEN SATURATION: 97 % | WEIGHT: 171 LBS | BODY MASS INDEX: 29.19 KG/M2 | SYSTOLIC BLOOD PRESSURE: 136 MMHG | HEART RATE: 71 BPM | TEMPERATURE: 97.1 F

## 2018-05-01 VITALS
OXYGEN SATURATION: 95 % | DIASTOLIC BLOOD PRESSURE: 60 MMHG | SYSTOLIC BLOOD PRESSURE: 90 MMHG | RESPIRATION RATE: 1 BRPM

## 2018-05-01 LAB
ALBUMIN SERPL-MCNC: 3.9 G/DL (ref 3.5–5.2)
ALP BLD-CCNC: 420 U/L (ref 40–130)
ALT SERPL-CCNC: 245 U/L (ref 5–41)
ANION GAP SERPL CALCULATED.3IONS-SCNC: 16 MMOL/L (ref 7–19)
AST SERPL-CCNC: 226 U/L (ref 5–40)
BILIRUB SERPL-MCNC: 0.5 MG/DL (ref 0.2–1.2)
BUN BLDV-MCNC: 13 MG/DL (ref 6–20)
CALCIUM SERPL-MCNC: 9.2 MG/DL (ref 8.6–10)
CHLORIDE BLD-SCNC: 96 MMOL/L (ref 98–111)
CO2: 23 MMOL/L (ref 22–29)
CREAT SERPL-MCNC: 0.5 MG/DL (ref 0.5–1.2)
GFR NON-AFRICAN AMERICAN: >60
GLUCOSE BLD-MCNC: 94 MG/DL (ref 74–109)
POTASSIUM SERPL-SCNC: 4.1 MMOL/L (ref 3.5–4.9)
SODIUM BLD-SCNC: 135 MMOL/L (ref 136–145)
TOTAL PROTEIN: 7 G/DL (ref 6.6–8.7)

## 2018-05-01 PROCEDURE — 2580000003 HC RX 258: Performed by: ORTHOPAEDIC SURGERY

## 2018-05-01 PROCEDURE — 7100000011 HC PHASE II RECOVERY - ADDTL 15 MIN: Performed by: ORTHOPAEDIC SURGERY

## 2018-05-01 PROCEDURE — 6360000002 HC RX W HCPCS: Performed by: NURSE ANESTHETIST, CERTIFIED REGISTERED

## 2018-05-01 PROCEDURE — 7100000001 HC PACU RECOVERY - ADDTL 15 MIN: Performed by: ORTHOPAEDIC SURGERY

## 2018-05-01 PROCEDURE — 3600000014 HC SURGERY LEVEL 4 ADDTL 15MIN: Performed by: ORTHOPAEDIC SURGERY

## 2018-05-01 PROCEDURE — 2720000001 HC MISC SURG SUPPLY STERILE $51-500: Performed by: ORTHOPAEDIC SURGERY

## 2018-05-01 PROCEDURE — 80053 COMPREHEN METABOLIC PANEL: CPT

## 2018-05-01 PROCEDURE — 6370000000 HC RX 637 (ALT 250 FOR IP): Performed by: ORTHOPAEDIC SURGERY

## 2018-05-01 PROCEDURE — 3700000000 HC ANESTHESIA ATTENDED CARE: Performed by: ORTHOPAEDIC SURGERY

## 2018-05-01 PROCEDURE — 3600000004 HC SURGERY LEVEL 4 BASE: Performed by: ORTHOPAEDIC SURGERY

## 2018-05-01 PROCEDURE — 3700000001 HC ADD 15 MINUTES (ANESTHESIA): Performed by: ORTHOPAEDIC SURGERY

## 2018-05-01 PROCEDURE — 6360000002 HC RX W HCPCS: Performed by: ORTHOPAEDIC SURGERY

## 2018-05-01 PROCEDURE — 7100000000 HC PACU RECOVERY - FIRST 15 MIN: Performed by: ORTHOPAEDIC SURGERY

## 2018-05-01 PROCEDURE — 73100 X-RAY EXAM OF WRIST: CPT

## 2018-05-01 PROCEDURE — 36415 COLL VENOUS BLD VENIPUNCTURE: CPT

## 2018-05-01 PROCEDURE — 93005 ELECTROCARDIOGRAM TRACING: CPT

## 2018-05-01 PROCEDURE — 7100000010 HC PHASE II RECOVERY - FIRST 15 MIN: Performed by: ORTHOPAEDIC SURGERY

## 2018-05-01 PROCEDURE — 2780000003 HC MISC SCREW $51-250: Performed by: ORTHOPAEDIC SURGERY

## 2018-05-01 PROCEDURE — 3209999900 FLUORO FOR SURGICAL PROCEDURES

## 2018-05-01 PROCEDURE — C1713 ANCHOR/SCREW BN/BN,TIS/BN: HCPCS | Performed by: ORTHOPAEDIC SURGERY

## 2018-05-01 PROCEDURE — 64415 NJX AA&/STRD BRCH PLXS IMG: CPT | Performed by: NURSE ANESTHETIST, CERTIFIED REGISTERED

## 2018-05-01 PROCEDURE — 2500000003 HC RX 250 WO HCPCS: Performed by: NURSE ANESTHETIST, CERTIFIED REGISTERED

## 2018-05-01 PROCEDURE — 6360000002 HC RX W HCPCS: Performed by: ANESTHESIOLOGY

## 2018-05-01 DEVICE — PEG BONE FXTN L20MM D2.2MM DST VOLAR RDL SMOOTH LOK CRSSLCK: Type: IMPLANTABLE DEVICE | Site: WRIST | Status: FUNCTIONAL

## 2018-05-01 DEVICE — PEG BONE FIX L22MM DIA2.2MM DSTL VOLAR RAD SMOOTH LCK FOR: Type: IMPLANTABLE DEVICE | Site: WRIST | Status: FUNCTIONAL

## 2018-05-01 DEVICE — SCREW BNE FIX L18MM DIA2.2MM DST VOLAR RAD SMOOTH LOK PEG: Type: IMPLANTABLE DEVICE | Site: WRIST | Status: FUNCTIONAL

## 2018-05-01 DEVICE — SCREW BNE L18MM DIA2.7MM DST VOLAR RAD NONLOCKING FULL THRD: Type: IMPLANTABLE DEVICE | Site: WRIST | Status: FUNCTIONAL

## 2018-05-01 DEVICE — SCREW BNE L14MM DIA2.7MM DST VOLAR RAD NONLOCKING FULL THRD: Type: IMPLANTABLE DEVICE | Site: WRIST | Status: FUNCTIONAL

## 2018-05-01 DEVICE — PEG BNE FIX L16MM DIA2.2MM DST VOLAR RAD SMOOTH LOK FOR DVR: Type: IMPLANTABLE DEVICE | Site: WRIST | Status: FUNCTIONAL

## 2018-05-01 DEVICE — SCREW BNE L20MM DIA2.7MM CORT DST RAD NONLOCKING FULL THRD: Type: IMPLANTABLE DEVICE | Site: WRIST | Status: FUNCTIONAL

## 2018-05-01 DEVICE — SCREW BNE L15MM DIA2.7MM DST RAD NONLOCKING FULL THRD SQ: Type: IMPLANTABLE DEVICE | Site: WRIST | Status: FUNCTIONAL

## 2018-05-01 DEVICE — PLATE BNE W24XL51MM 12 H L DST RAD TI LOK COMPR LO PROF NAR: Type: IMPLANTABLE DEVICE | Site: WRIST | Status: FUNCTIONAL

## 2018-05-01 RX ORDER — HYDROMORPHONE HCL IN 0.9% NACL 0.5 MG/ML
0.25 SYRINGE (ML) INTRAVENOUS EVERY 5 MIN PRN
Status: DISCONTINUED | OUTPATIENT
Start: 2018-05-01 | End: 2018-05-01 | Stop reason: HOSPADM

## 2018-05-01 RX ORDER — ACETAMINOPHEN 500 MG
1000 TABLET ORAL
Status: COMPLETED | OUTPATIENT
Start: 2018-05-01 | End: 2018-05-01

## 2018-05-01 RX ORDER — NAPROXEN 500 MG/1
500 TABLET ORAL 2 TIMES DAILY WITH MEALS
Status: ON HOLD | COMMUNITY
End: 2020-05-21 | Stop reason: HOSPADM

## 2018-05-01 RX ORDER — SODIUM CHLORIDE, SODIUM LACTATE, POTASSIUM CHLORIDE, CALCIUM CHLORIDE 600; 310; 30; 20 MG/100ML; MG/100ML; MG/100ML; MG/100ML
INJECTION, SOLUTION INTRAVENOUS CONTINUOUS
Status: DISCONTINUED | OUTPATIENT
Start: 2018-05-01 | End: 2018-05-01 | Stop reason: HOSPADM

## 2018-05-01 RX ORDER — SODIUM CHLORIDE 0.9 % (FLUSH) 0.9 %
10 SYRINGE (ML) INJECTION EVERY 12 HOURS SCHEDULED
Status: DISCONTINUED | OUTPATIENT
Start: 2018-05-01 | End: 2018-05-01 | Stop reason: HOSPADM

## 2018-05-01 RX ORDER — FENTANYL CITRATE 50 UG/ML
50 INJECTION, SOLUTION INTRAMUSCULAR; INTRAVENOUS
Status: DISCONTINUED | OUTPATIENT
Start: 2018-05-01 | End: 2018-05-01 | Stop reason: HOSPADM

## 2018-05-01 RX ORDER — MORPHINE SULFATE 4 MG/ML
4 INJECTION, SOLUTION INTRAMUSCULAR; INTRAVENOUS
Status: DISCONTINUED | OUTPATIENT
Start: 2018-05-01 | End: 2018-05-01 | Stop reason: HOSPADM

## 2018-05-01 RX ORDER — MORPHINE SULFATE 4 MG/ML
4 INJECTION, SOLUTION INTRAMUSCULAR; INTRAVENOUS EVERY 5 MIN PRN
Status: DISCONTINUED | OUTPATIENT
Start: 2018-05-01 | End: 2018-05-01 | Stop reason: HOSPADM

## 2018-05-01 RX ORDER — PROPOFOL 10 MG/ML
INJECTION, EMULSION INTRAVENOUS PRN
Status: DISCONTINUED | OUTPATIENT
Start: 2018-05-01 | End: 2018-05-01 | Stop reason: SDUPTHER

## 2018-05-01 RX ORDER — MORPHINE SULFATE 4 MG/ML
2 INJECTION, SOLUTION INTRAMUSCULAR; INTRAVENOUS EVERY 5 MIN PRN
Status: DISCONTINUED | OUTPATIENT
Start: 2018-05-01 | End: 2018-05-01 | Stop reason: HOSPADM

## 2018-05-01 RX ORDER — CELECOXIB 200 MG/1
200 CAPSULE ORAL DAILY
Status: DISCONTINUED | OUTPATIENT
Start: 2018-05-01 | End: 2018-05-01 | Stop reason: HOSPADM

## 2018-05-01 RX ORDER — SODIUM CHLORIDE, SODIUM LACTATE, POTASSIUM CHLORIDE, CALCIUM CHLORIDE 600; 310; 30; 20 MG/100ML; MG/100ML; MG/100ML; MG/100ML
INJECTION, SOLUTION INTRAVENOUS CONTINUOUS
Status: DISCONTINUED | OUTPATIENT
Start: 2018-05-01 | End: 2018-05-01 | Stop reason: SDUPTHER

## 2018-05-01 RX ORDER — DEXAMETHASONE SODIUM PHOSPHATE 10 MG/ML
10 INJECTION INTRAMUSCULAR; INTRAVENOUS ONCE
Status: DISCONTINUED | OUTPATIENT
Start: 2018-05-01 | End: 2018-05-01 | Stop reason: HOSPADM

## 2018-05-01 RX ORDER — HYDROCODONE BITARTRATE AND ACETAMINOPHEN 7.5; 325 MG/1; MG/1
1 TABLET ORAL 2 TIMES DAILY PRN
COMMUNITY
End: 2018-08-08

## 2018-05-01 RX ORDER — SODIUM CHLORIDE 0.9 % (FLUSH) 0.9 %
10 SYRINGE (ML) INJECTION PRN
Status: DISCONTINUED | OUTPATIENT
Start: 2018-05-01 | End: 2018-05-01 | Stop reason: HOSPADM

## 2018-05-01 RX ORDER — OXYCODONE HCL 10 MG/1
10 TABLET, FILM COATED, EXTENDED RELEASE ORAL
Status: COMPLETED | OUTPATIENT
Start: 2018-05-01 | End: 2018-05-01

## 2018-05-01 RX ORDER — DIPHENHYDRAMINE HYDROCHLORIDE 50 MG/ML
12.5 INJECTION INTRAMUSCULAR; INTRAVENOUS
Status: DISCONTINUED | OUTPATIENT
Start: 2018-05-01 | End: 2018-05-01 | Stop reason: HOSPADM

## 2018-05-01 RX ORDER — LIDOCAINE HYDROCHLORIDE 10 MG/ML
1 INJECTION, SOLUTION EPIDURAL; INFILTRATION; INTRACAUDAL; PERINEURAL
Status: DISCONTINUED | OUTPATIENT
Start: 2018-05-01 | End: 2018-05-01 | Stop reason: HOSPADM

## 2018-05-01 RX ORDER — PROMETHAZINE HYDROCHLORIDE 25 MG/ML
6.25 INJECTION, SOLUTION INTRAMUSCULAR; INTRAVENOUS
Status: DISCONTINUED | OUTPATIENT
Start: 2018-05-01 | End: 2018-05-01 | Stop reason: HOSPADM

## 2018-05-01 RX ORDER — MEPERIDINE HYDROCHLORIDE 50 MG/ML
12.5 INJECTION INTRAMUSCULAR; INTRAVENOUS; SUBCUTANEOUS EVERY 5 MIN PRN
Status: DISCONTINUED | OUTPATIENT
Start: 2018-05-01 | End: 2018-05-01 | Stop reason: HOSPADM

## 2018-05-01 RX ORDER — ROPIVACAINE HYDROCHLORIDE 2 MG/ML
INJECTION, SOLUTION EPIDURAL; INFILTRATION; PERINEURAL PRN
Status: DISCONTINUED | OUTPATIENT
Start: 2018-05-01 | End: 2018-05-01 | Stop reason: HOSPADM

## 2018-05-01 RX ORDER — METOCLOPRAMIDE HYDROCHLORIDE 5 MG/ML
10 INJECTION INTRAMUSCULAR; INTRAVENOUS
Status: DISCONTINUED | OUTPATIENT
Start: 2018-05-01 | End: 2018-05-01 | Stop reason: HOSPADM

## 2018-05-01 RX ORDER — LIDOCAINE HYDROCHLORIDE 10 MG/ML
INJECTION, SOLUTION INFILTRATION; PERINEURAL PRN
Status: DISCONTINUED | OUTPATIENT
Start: 2018-05-01 | End: 2018-05-01 | Stop reason: SDUPTHER

## 2018-05-01 RX ORDER — HYDROMORPHONE HCL IN 0.9% NACL 0.5 MG/ML
0.5 SYRINGE (ML) INTRAVENOUS EVERY 5 MIN PRN
Status: DISCONTINUED | OUTPATIENT
Start: 2018-05-01 | End: 2018-05-01 | Stop reason: HOSPADM

## 2018-05-01 RX ORDER — MIDAZOLAM HYDROCHLORIDE 1 MG/ML
2 INJECTION INTRAMUSCULAR; INTRAVENOUS
Status: COMPLETED | OUTPATIENT
Start: 2018-05-01 | End: 2018-05-01

## 2018-05-01 RX ORDER — HYDRALAZINE HYDROCHLORIDE 20 MG/ML
5 INJECTION INTRAMUSCULAR; INTRAVENOUS EVERY 10 MIN PRN
Status: DISCONTINUED | OUTPATIENT
Start: 2018-05-01 | End: 2018-05-01 | Stop reason: HOSPADM

## 2018-05-01 RX ORDER — SCOLOPAMINE TRANSDERMAL SYSTEM 1 MG/1
1 PATCH, EXTENDED RELEASE TRANSDERMAL ONCE
Status: DISCONTINUED | OUTPATIENT
Start: 2018-05-01 | End: 2018-05-01 | Stop reason: SDUPTHER

## 2018-05-01 RX ORDER — ONDANSETRON 2 MG/ML
INJECTION INTRAMUSCULAR; INTRAVENOUS PRN
Status: DISCONTINUED | OUTPATIENT
Start: 2018-05-01 | End: 2018-05-01 | Stop reason: SDUPTHER

## 2018-05-01 RX ORDER — LABETALOL HYDROCHLORIDE 5 MG/ML
5 INJECTION, SOLUTION INTRAVENOUS EVERY 10 MIN PRN
Status: DISCONTINUED | OUTPATIENT
Start: 2018-05-01 | End: 2018-05-01 | Stop reason: HOSPADM

## 2018-05-01 RX ORDER — SCOLOPAMINE TRANSDERMAL SYSTEM 1 MG/1
1 PATCH, EXTENDED RELEASE TRANSDERMAL ONCE
Status: DISCONTINUED | OUTPATIENT
Start: 2018-05-01 | End: 2018-05-01 | Stop reason: HOSPADM

## 2018-05-01 RX ORDER — HYDROCODONE BITARTRATE AND ACETAMINOPHEN 7.5; 325 MG/1; MG/1
1-2 TABLET ORAL
Qty: 40 TABLET | Refills: 0 | Status: SHIPPED | OUTPATIENT
Start: 2018-05-01 | End: 2018-05-04

## 2018-05-01 RX ADMIN — SODIUM CHLORIDE, SODIUM LACTATE, POTASSIUM CHLORIDE, AND CALCIUM CHLORIDE: 600; 310; 30; 20 INJECTION, SOLUTION INTRAVENOUS at 13:56

## 2018-05-01 RX ADMIN — PROPOFOL 200 MG: 10 INJECTION, EMULSION INTRAVENOUS at 13:59

## 2018-05-01 RX ADMIN — ACETAMINOPHEN 1000 MG: 500 TABLET, FILM COATED ORAL at 10:52

## 2018-05-01 RX ADMIN — CELECOXIB 200 MG: 200 CAPSULE ORAL at 10:52

## 2018-05-01 RX ADMIN — ONDANSETRON HYDROCHLORIDE 4 MG: 2 SOLUTION INTRAMUSCULAR; INTRAVENOUS at 15:03

## 2018-05-01 RX ADMIN — LIDOCAINE HYDROCHLORIDE 50 MG: 10 INJECTION, SOLUTION INFILTRATION; PERINEURAL at 13:59

## 2018-05-01 RX ADMIN — Medication 2 G: at 14:03

## 2018-05-01 RX ADMIN — OXYCODONE HYDROCHLORIDE 10 MG: 10 TABLET, FILM COATED, EXTENDED RELEASE ORAL at 10:52

## 2018-05-01 RX ADMIN — SODIUM CHLORIDE, SODIUM LACTATE, POTASSIUM CHLORIDE, AND CALCIUM CHLORIDE: 600; 310; 30; 20 INJECTION, SOLUTION INTRAVENOUS at 10:52

## 2018-05-01 RX ADMIN — MIDAZOLAM HYDROCHLORIDE 2 MG: 1 INJECTION, SOLUTION INTRAMUSCULAR; INTRAVENOUS at 13:45

## 2018-05-01 ASSESSMENT — PAIN SCALES - GENERAL
PAINLEVEL_OUTOF10: 0

## 2018-05-01 ASSESSMENT — LIFESTYLE VARIABLES: SMOKING_STATUS: 0

## 2018-05-01 ASSESSMENT — ENCOUNTER SYMPTOMS: SHORTNESS OF BREATH: 0

## 2018-05-02 LAB
EKG P AXIS: 58 DEGREES
EKG P-R INTERVAL: 144 MS
EKG Q-T INTERVAL: 362 MS
EKG QRS DURATION: 100 MS
EKG QTC CALCULATION (BAZETT): 397 MS
EKG T AXIS: 53 DEGREES

## 2018-05-15 ENCOUNTER — HOSPITAL ENCOUNTER (EMERGENCY)
Facility: HOSPITAL | Age: 53
Discharge: HOME | End: 2018-05-15
Attending: EMERGENCY MEDICINE | Admitting: EMERGENCY MEDICINE

## 2018-05-15 VITALS
RESPIRATION RATE: 18 BRPM | BODY MASS INDEX: 29.02 KG/M2 | WEIGHT: 170 LBS | HEIGHT: 64 IN | DIASTOLIC BLOOD PRESSURE: 76 MMHG | TEMPERATURE: 98.1 F | OXYGEN SATURATION: 97 % | SYSTOLIC BLOOD PRESSURE: 123 MMHG | HEART RATE: 89 BPM

## 2018-05-15 DIAGNOSIS — Z51.89 VISIT FOR WOUND CHECK: Primary | ICD-10-CM

## 2018-05-15 PROCEDURE — 99283 EMERGENCY DEPT VISIT LOW MDM: CPT

## 2018-05-15 RX ORDER — IBUPROFEN 200 MG
CAPSULE ORAL ONCE
Status: DISCONTINUED | OUTPATIENT
Start: 2018-05-15 | End: 2018-05-15 | Stop reason: HOSPADM

## 2018-05-15 NOTE — ED NOTES
Pt reports he had his stitches removed yesterday after having carpal tunnel release surgery. Pt reports this morning he noticed that the edges of his incision have opened. Wound clean, dry, without drainage. Edges of incision open.     Claudette Anne RN  05/15/18 9632

## 2018-05-15 NOTE — DISCHARGE INSTRUCTIONS
Jaime,     Please see Dr. Garcia 5/16 and return here for worsening symptoms, fevers or any other issues.

## 2018-05-15 NOTE — ED PROVIDER NOTES
"Subjective   51 y/o male with carpal tunnel release 3 weeks ago by Dr. Wahl who had his sutures taken out yesterday who states he was at work and noted part of the wound to \"come open\" without noted fevers, chills, falls, trauma, numbness, tingling, loss of sensation or other issues. He arrives in [facility]      Family, social and past history reviewed as below, prior documentation of H and Ps and other documentation are reviewed:    Past Medical History:  No date: CAD (coronary artery disease)  No date: COPD (chronic obstructive pulmonary disease)  No date: Degenerative joint disease  No date: Fahr's disease  No date: GERD (gastroesophageal reflux disease)  No date: Hemoptysis  No date: Hepatitis C  No date: Hypocalcemia  No date: Infectious viral hepatitis      Comment: Hepatitis C  No date: Pseudohypoparathyroidism  No date: Psoriasis    Social History    Marital status: Single              Spouse name:                       Years of education:                 Number of children:               Occupational History    Works at [employer]    Social History Main Topics    Smoking status: Current Some Day Smoker                                                      Packs/day: 1     Years: 0.00           Types: Cigars    Smokeless tobacco: Former User                       Alcohol use: No                 Comment: Sober for 18 months    Drug use: No              Sexual activity: Defer                Other Topics            Concern    None on file    Social History Narrative    None on file    Family history: reviewed and non contributory                Review of Systems   All other systems reviewed and are negative.      Past Medical History:   Diagnosis Date   • CAD (coronary artery disease)    • COPD (chronic obstructive pulmonary disease)    • Degenerative joint disease    • Fahr's disease    • GERD (gastroesophageal reflux disease)    • Hemoptysis    • Hepatitis C    • Hypocalcemia    • Infectious viral hepatitis  "    Hepatitis C   • Pseudohypoparathyroidism    • Psoriasis        Allergies   Allergen Reactions   • Bactrim [Sulfamethoxazole-Trimethoprim]    • Cephalexin      Nausea/Diarrhea       Past Surgical History:   Procedure Laterality Date   • FOOT SURGERY     • FRACTURE SURGERY Right     foot       Family History   Problem Relation Age of Onset   • Alcohol abuse Mother    • Cirrhosis Father    • Hepatitis Father    • Alcohol abuse Father    • Alcohol abuse Other        Social History     Social History   • Marital status: Single     Social History Main Topics   • Smoking status: Current Some Day Smoker     Types: Cigars   • Smokeless tobacco: Former User   • Alcohol use No      Comment: Sober for 18 months   • Drug use: No   • Sexual activity: Defer     Other Topics Concern   • Not on file           Objective   Physical Exam   Constitutional: He appears well-developed and well-nourished.   HENT:   Head: Normocephalic.   Nose: Nose normal.   Eyes: Conjunctivae and EOM are normal. Pupils are equal, round, and reactive to light.   Neck: Normal range of motion. Neck supple.   Musculoskeletal: He exhibits no edema, tenderness or deformity.   At the left hand there is a well appearing wound with the forearm aspect covered in steri-strips but does not appear infected. At the palmar aspect there is a small area 1.0 cm in diameter that is dehisced ONLY at the skin and no further wound dehiscence lower than this. There are no signs of infection, sensation intact , no streaking.    Neurological: He is alert.   Skin: Skin is warm. Capillary refill takes less than 2 seconds.   Psychiatric: He has a normal mood and affect. His behavior is normal.   Vitals reviewed.      Procedures           ED Course  ED Course      Dr. Guy carnes with covering wound and applying abx ointment. He wishes patient ot see Dr. Garcia tomorrow.     At this time it is only superficial dehiscence. No signs of infection.             MDM      Final diagnoses:    Visit for wound check            Rajesh Fraga MD  05/15/18 9917

## 2018-08-08 ENCOUNTER — HOSPITAL ENCOUNTER (EMERGENCY)
Age: 53
Discharge: HOME | End: 2018-08-08
Payer: MEDICAID

## 2018-08-08 VITALS
SYSTOLIC BLOOD PRESSURE: 146 MMHG | BODY MASS INDEX: 29.02 KG/M2 | WEIGHT: 170 LBS | HEART RATE: 91 BPM | OXYGEN SATURATION: 95 % | RESPIRATION RATE: 17 BRPM | TEMPERATURE: 98.4 F | DIASTOLIC BLOOD PRESSURE: 94 MMHG | HEIGHT: 64 IN

## 2018-08-08 DIAGNOSIS — L02.419 AXILLARY ABSCESS: Primary | ICD-10-CM

## 2018-08-08 PROCEDURE — 99282 EMERGENCY DEPT VISIT SF MDM: CPT

## 2018-08-08 PROCEDURE — 87186 SC STD MICRODIL/AGAR DIL: CPT

## 2018-08-08 PROCEDURE — 87070 CULTURE OTHR SPECIMN AEROBIC: CPT

## 2018-08-08 PROCEDURE — 86403 PARTICLE AGGLUT ANTBDY SCRN: CPT

## 2018-08-08 PROCEDURE — 10061 I&D ABSCESS COMP/MULTIPLE: CPT | Performed by: NURSE PRACTITIONER

## 2018-08-08 PROCEDURE — 87205 SMEAR GRAM STAIN: CPT

## 2018-08-08 PROCEDURE — 10060 I&D ABSCESS SIMPLE/SINGLE: CPT

## 2018-08-08 PROCEDURE — 99283 EMERGENCY DEPT VISIT LOW MDM: CPT | Performed by: NURSE PRACTITIONER

## 2018-08-08 RX ORDER — ONDANSETRON 4 MG/1
4 TABLET, ORALLY DISINTEGRATING ORAL EVERY 8 HOURS PRN
Qty: 15 TABLET | Refills: 0 | Status: SHIPPED | OUTPATIENT
Start: 2018-08-08 | End: 2018-08-23

## 2018-08-08 RX ORDER — CLINDAMYCIN HYDROCHLORIDE 300 MG/1
300 CAPSULE ORAL 3 TIMES DAILY
Qty: 30 CAPSULE | Refills: 0 | Status: SHIPPED | OUTPATIENT
Start: 2018-08-08 | End: 2018-08-18

## 2018-08-08 ASSESSMENT — ENCOUNTER SYMPTOMS: VOMITING: 0

## 2018-08-08 NOTE — ED PROVIDER NOTES
visualized and preliminarily interpreted by the emergency physician with the below findings:        Interpretation per the Radiologist below, if available at the time of this note:    No orders to display         ED BEDSIDE ULTRASOUND:   Performed by ED Physician - none    LABS:  Labs Reviewed   WOUND CULTURE       All other labs were within normal range or not returned as of this dictation. RE-ASSESSMENT           EMERGENCY DEPARTMENT COURSE and DIFFERENTIAL DIAGNOSIS/MDM:   Vitals:    Vitals:    08/08/18 1302   BP: (!) 146/94   Pulse: 91   Resp: 17   Temp: 98.4 °F (36.9 °C)   TempSrc: Temporal   SpO2: 95%   Weight: 170 lb (77.1 kg)   Height: 5' 4\" (1.626 m)       MDM      CONSULTS:  None    PROCEDURES:  Unless otherwise noted below, none     Incision/Drainage  Date/Time: 8/8/2018 1:35 PM  Performed by: Kathy Mchugh  Authorized by: Kathy Mchugh     Consent:     Consent obtained:  Verbal    Consent given by:  Patient    Risks discussed:  Incomplete drainage  Location:     Type:  Abscess    Size:  1    Location:  Upper extremity    Upper extremity location:  Arm    Arm location:  L upper arm (axilla)  Pre-procedure details:     Skin preparation:  Betadine  Anesthesia (see MAR for exact dosages): Anesthesia method:  Local infiltration    Local anesthetic:  Lidocaine 1% w/o epi  Procedure type:     Complexity:  Simple  Procedure details:     Needle aspiration: no      Incision types:  Stab incision    Incision depth:  Subcutaneous    Scalpel blade:  11    Wound management:  Probed and deloculated    Drainage:  Purulent    Drainage amount:  Scant    Packing materials:  None  Post-procedure details:     Patient tolerance of procedure:   Tolerated well, no immediate complications  Incision/Drainage  Date/Time: 8/8/2018 1:36 PM  Performed by: Kathy Mchugh  Authorized by: Kathy Mchugh     Consent:     Consent obtained:  Verbal    Consent given by:  Patient    Risks discussed:  Incomplete drainage  Location:

## 2018-08-11 LAB
GRAM STAIN RESULT: ABNORMAL
ORGANISM: ABNORMAL
WOUND/ABSCESS: ABNORMAL
WOUND/ABSCESS: ABNORMAL

## 2019-02-18 ENCOUNTER — TELEPHONE (OUTPATIENT)
Dept: GASTROENTEROLOGY | Facility: CLINIC | Age: 54
End: 2019-02-18

## 2019-02-18 NOTE — TELEPHONE ENCOUNTER
Juan J Mccormick Jr., MD    Pt records from physician reviewed  According to medical record Jaime Taveras does not have a history of heart disease or lung disease.  According to medical record Jaime Taveras is not currently on blood thinner.  According to medical record Jaime Taveras is not currently exhibiting abdominal pain, weight loss, hematochezia, melena,  change in bowels, or other symptoms to indicate pt would need to be seen in office.    Will submit order for Jaime Taveras to proceed with [facility]    Medication will be verified as well as a lack of GI related symptomology when pt is scheduled for procedure.

## 2019-02-19 ENCOUNTER — PREP FOR SURGERY (OUTPATIENT)
Dept: OTHER | Facility: HOSPITAL | Age: 54
End: 2019-02-19

## 2019-02-19 DIAGNOSIS — Z12.11 COLON CANCER SCREENING: Primary | ICD-10-CM

## 2019-02-19 NOTE — TELEPHONE ENCOUNTER
Pt states no GI, HEART, or LUNG problems.     Verified insurance, meds, and past medical history.    PT verbally understood instructions.     Will mail a copy of instructions.     Please put in case request.     Thanks.     Schedule 03/27/2019 at 9:45am

## 2019-03-27 ENCOUNTER — HOSPITAL ENCOUNTER (OUTPATIENT)
Facility: HOSPITAL | Age: 54
Setting detail: HOSPITAL OUTPATIENT SURGERY
Discharge: HOME | End: 2019-03-27
Attending: INTERNAL MEDICINE | Admitting: INTERNAL MEDICINE

## 2019-03-27 ENCOUNTER — TELEPHONE (OUTPATIENT)
Dept: GASTROENTEROLOGY | Facility: CLINIC | Age: 54
End: 2019-03-27

## 2019-03-27 VITALS
HEIGHT: 63 IN | SYSTOLIC BLOOD PRESSURE: 115 MMHG | WEIGHT: 170 LBS | TEMPERATURE: 97.8 F | BODY MASS INDEX: 30.12 KG/M2 | HEART RATE: 72 BPM | OXYGEN SATURATION: 97 % | DIASTOLIC BLOOD PRESSURE: 80 MMHG | RESPIRATION RATE: 20 BRPM

## 2019-03-27 PROCEDURE — G0463 HOSPITAL OUTPT CLINIC VISIT: HCPCS | Performed by: INTERNAL MEDICINE

## 2019-03-27 RX ORDER — SODIUM CHLORIDE 0.9 % (FLUSH) 0.9 %
3 SYRINGE (ML) INJECTION AS NEEDED
Status: DISCONTINUED | OUTPATIENT
Start: 2019-03-27 | End: 2019-03-27 | Stop reason: HOSPADM

## 2019-03-27 RX ORDER — SODIUM CHLORIDE 9 MG/ML
500 INJECTION, SOLUTION INTRAVENOUS CONTINUOUS PRN
Status: DISCONTINUED | OUTPATIENT
Start: 2019-03-27 | End: 2019-03-27 | Stop reason: HOSPADM
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
## 2019-03-29 NOTE — TELEPHONE ENCOUNTER
Sent letter to PCP and to the PT     Left messages on listed phone number for him to contact our office to reschedule.

## 2019-04-01 ENCOUNTER — TELEPHONE (OUTPATIENT)
Dept: GASTROENTEROLOGY | Facility: CLINIC | Age: 54
End: 2019-04-01

## 2019-04-01 NOTE — TELEPHONE ENCOUNTER
Spoke with pt this morning.     Schedule Colonoscopy for 04/17/2019 at 845am     We went over instructions and he verbally understood he would need a  this time.     I need new case request and new prep sent to pharmacy

## 2019-04-03 NOTE — TELEPHONE ENCOUNTER
"PT called this morning to cancel his colonoscopy that we reschedule for 04/17/2019.     Pt stated he was \"dying\" from liver issues and didn't see the uses of having a colonoscopy.     I explained to pt that this procedure was very important and he stated he was to busy.     I will send letter to pcp/ referring doctor.   "

## 2019-04-04 ENCOUNTER — TELEPHONE (OUTPATIENT)
Dept: GASTROENTEROLOGY | Facility: CLINIC | Age: 54
End: 2019-04-04

## 2019-04-04 NOTE — TELEPHONE ENCOUNTER
"I have reviewed his referral note and I am not sure if he is referring to his Hepatitis C in regards to \"dying from liver issues.\"    I would like to see him in the office to further discuss this with him as well as the importance of colonoscopy screening   If he is referring to his Hepatitis C we can discuss treatment options as Hepatitis C can be cured    Thank you    "

## 2019-04-24 ENCOUNTER — OFFICE VISIT (OUTPATIENT)
Dept: GASTROENTEROLOGY | Facility: CLINIC | Age: 54
End: 2019-04-24

## 2019-04-24 VITALS
WEIGHT: 170 LBS | HEIGHT: 63 IN | BODY MASS INDEX: 30.12 KG/M2 | HEART RATE: 86 BPM | TEMPERATURE: 97 F | DIASTOLIC BLOOD PRESSURE: 80 MMHG | SYSTOLIC BLOOD PRESSURE: 136 MMHG | OXYGEN SATURATION: 98 %

## 2019-04-24 DIAGNOSIS — B18.2 CHRONIC HEPATITIS C WITHOUT HEPATIC COMA (HCC): ICD-10-CM

## 2019-04-24 DIAGNOSIS — F10.10 ALCOHOL ABUSE: ICD-10-CM

## 2019-04-24 DIAGNOSIS — R79.89 ELEVATED LFTS: ICD-10-CM

## 2019-04-24 DIAGNOSIS — Z12.11 ENCOUNTER FOR SCREENING FOR MALIGNANT NEOPLASM OF COLON: Primary | ICD-10-CM

## 2019-04-24 PROCEDURE — 99204 OFFICE O/P NEW MOD 45 MIN: CPT | Performed by: NURSE PRACTITIONER

## 2019-04-24 RX ORDER — APREMILAST 10-20-30MG
KIT ORAL
COMMUNITY
Start: 2019-03-06 | End: 2021-08-24

## 2019-04-24 NOTE — PROGRESS NOTES
Chief Complaint   Patient presents with   • Colonoscopy     never had colon but wants his liver checked out       PCP: Juan J Mccormick Jr., MD  REFER: Juan J Mccormick Jr., *    Subjective     HPI    Diagnosis with Hepatitis C age of 23.  History of IV drugs use.  He consumes 1 6 pack of beer per day (reports consumption of 3 beers already this morning), and has done this since age of 20.  No previous treatment for Hepatitis C. Family history of liver disease in father.   No previous colonoscopy, no family history of colon polyps/colon cancer.  No abdominal pain, weight described as stable, no changes in bowels, no bright red blood per rectum or melena stool.    ETOH USE: yes      DRUG USE: yes, history of  Genotype:  unknown    Fibrosis Score: not yet determined  determined by not yet determined   Cirrhosis: unknown Renal disease:No  Previous Treatment:  No  HCV Viral Load: unknown  Date     Past Medical History:   Diagnosis Date   • CAD (coronary artery disease)    • COPD (chronic obstructive pulmonary disease) (CMS/HCC)    • Degenerative joint disease    • Fahr's disease (CMS/HCC)    • GERD (gastroesophageal reflux disease)    • Hemoptysis    • Hepatitis C    • Hypocalcemia    • Infectious viral hepatitis     Hepatitis C   • Pseudohypoparathyroidism    • Psoriasis        Past Surgical History:   Procedure Laterality Date   • FOOT SURGERY     • FRACTURE SURGERY Right     foot   • HAND SURGERY         Outpatient Medications Marked as Taking for the 4/24/19 encounter (Office Visit) with Elias White APRN   Medication Sig Dispense Refill   • calcitriol (ROCALTROL) 0.25 MCG capsule Take 0.25 mcg by mouth three times daily      • calcium carbonate (TUMS) 500 MG chewable tablet Chew 1 tablet Daily.     • clobetasol (TEMOVATE) 0.05 % ointment Apply  topically 2 (Two) Times a Day.     • diclofenac (VOLTAREN) 50 MG EC tablet As Needed.     • ibuprofen (ADVIL,MOTRIN) 800 MG tablet      • omeprazole (priLOSEC) 20  MG capsule Take 20 mg by mouth daily     • OTEZLA 10 & 20 & 30 MG tablet therapy pack      • oxyCODONE-acetaminophen (PERCOCET) 7.5-325 MG per tablet Take 1 tablet by mouth Every 4 (Four) Hours As Needed for Moderate Pain . 15 tablet 0   • VENTOLIN  (90 BASE) MCG/ACT inhaler          Allergies   Allergen Reactions   • Bactrim [Sulfamethoxazole-Trimethoprim]    • Cephalexin      Nausea/Diarrhea       Social History     Socioeconomic History   • Marital status: Single     Spouse name: Not on file   • Number of children: Not on file   • Years of education: Not on file   • Highest education level: Not on file   Tobacco Use   • Smoking status: Current Every Day Smoker     Packs/day: 1.00     Years: 30.00     Pack years: 30.00     Types: Cigarettes   • Smokeless tobacco: Former User   Substance and Sexual Activity   • Alcohol use: Yes     Comment: daily   • Drug use: No   • Sexual activity: Defer       Family History   Problem Relation Age of Onset   • Alcohol abuse Mother    • Cirrhosis Father    • Hepatitis Father    • Alcohol abuse Father    • Alcohol abuse Other    • Colon cancer Neg Hx    • Colon polyps Neg Hx        Review of Systems   Constitutional: Negative for fatigue, fever and unexpected weight change.   HENT: Negative for hearing loss, sore throat and voice change.    Eyes: Negative for visual disturbance.   Respiratory: Negative for cough, shortness of breath and wheezing.    Cardiovascular: Negative for chest pain and palpitations.   Gastrointestinal: Negative for abdominal pain, blood in stool and vomiting.   Endocrine: Negative for polydipsia and polyuria.   Genitourinary: Negative for difficulty urinating, dysuria, hematuria and urgency.   Musculoskeletal: Negative for joint swelling and myalgias.   Skin: Negative for color change, rash and wound.   Neurological: Negative for dizziness, tremors, seizures and syncope.   Hematological: Does not bruise/bleed easily.   Psychiatric/Behavioral: Negative  "for agitation and confusion. The patient is not nervous/anxious.        Objective     Vitals:    04/24/19 0839   BP: 136/80   Pulse: 86   Temp: 97 °F (36.1 °C)   SpO2: 98%   Weight: 77.1 kg (170 lb)   Height: 160 cm (63\")     Body mass index is 30.11 kg/m².    Physical Exam   Constitutional: He is oriented to person, place, and time. He appears well-developed and well-nourished. He is cooperative.   HENT:   Head: Normocephalic and atraumatic.   Eyes: Conjunctivae are normal. Pupils are equal, round, and reactive to light. No scleral icterus.   Neck: Normal range of motion. Neck supple. No JVD present. No thyroid mass and no thyromegaly present.   Cardiovascular: Normal rate, regular rhythm and normal heart sounds. Exam reveals no gallop and no friction rub.   No murmur heard.  Pulmonary/Chest: Effort normal and breath sounds normal. No accessory muscle usage. No respiratory distress. He has no wheezes. He has no rales.   Abdominal: Soft. Normal appearance and bowel sounds are normal. He exhibits no distension, no ascites and no mass. There is no hepatosplenomegaly. There is no tenderness. There is no rebound and no guarding.   Musculoskeletal: Normal range of motion. He exhibits no edema or tenderness.     Vascular Status -  His right foot exhibits normal foot vasculature  and no edema. His left foot exhibits normal foot vasculature  and no edema.  Lymphadenopathy:     He has no cervical adenopathy.   Neurological: He is alert and oriented to person, place, and time. He has normal strength. Gait normal.   Skin: Skin is warm, dry and intact. No rash noted.       Imaging Results (most recent)     None          Body mass index is 30.11 kg/m².    Assessment/Plan     Jaime was seen today for colonoscopy.    Diagnoses and all orders for this visit:    Encounter for screening for malignant neoplasm of colon  -     Occult Blood X 3, Stool - Stool, Per Rectum    Chronic hepatitis C without hepatic coma (CMS/HCC)  -     HCV " RT-PCR,Quant(Non-Graph); Future    Elevated LFTs  -     Ferritin  -     Iron Profile  -     Protime-INR  -     Comprehensive Metabolic Panel  -     CBC (No Diff)  -     US Liver; Future  -     US Elastography Parenchyma; Future    Alcohol abuse    strongly encouraged etoh cessation, explained we could not treatment his Hepatitis C until he has stopped drinking  Explained liver disease can ultimately end in death and if he continues to consume etoh on daily basis he is at increased risk for developing liver disease, especially with his history of Hepatitis C, he verbalized understanding  He will follow up in office in 6 weeks to determine compliance with follow up as well as to see how is doing with etoh intake    Pt declined colonoscopy evaluation at this time. Risks and benefits of colonoscopy discussed with patient. Advised some polyps can develop into a cancer and these could be removed during colonoscopy. Pt voiced understanding and continued to decline evaluation.  He was agreeable heme stool cards.    I have discussed patients with Hepatitis C have increased risk of developing cirrhosis if Hepatitis C is left untreated.  Patient's with cirrhosis have an increased risk of developing a hepatocellular carcinoma. I strongly encouraged the avoidance to etoh consumption and avoidance of illicit drug use prior to treatment of Hepatitis C and during treatment of Hepatitis C.    I have discussed possible treatment options for Hepatitis C, however, specific treatment and length of treatment will depend on genotype, presence of cirrhosis, and other pertinent health history.  Treatment will also be largely influenced by pt insurance plan.  I have discussed common side effects of headache, fatigue, and diarrhea.  Patients who are compliant with prescribed therapy have over a 90% successful cure rate.    Medication will be submitted to insurance for approval once all results of requested testing has been reviewed and etoh  cessation      Once a patient is on Hepatitis C therapy we will request pt follow up in office 2 weeks after starting therapy and may request pt follow up further in office after 2 week visit.      Patient's Body mass index is 30.11 kg/m². BMI is above normal parameters. Recommendations include: no follow up.    I advised Jaime of the risks of continuing to use tobacco, and I provided him with tobacco cessation educational materials in the After Visit Summary.     During this visit, I spent 3 minutes counseling the patient regarding tobacco cessation.    There are no Patient Instructions on file for this visit.

## 2019-05-08 ENCOUNTER — HOSPITAL ENCOUNTER (EMERGENCY)
Age: 54
Discharge: HOME | End: 2019-05-09
Attending: EMERGENCY MEDICINE
Payer: MEDICAID

## 2019-05-08 LAB
ALBUMIN SERPL-MCNC: 4.3 G/DL (ref 3.5–5.2)
ALP BLD-CCNC: 96 U/L (ref 40–130)
ALT SERPL-CCNC: 57 U/L (ref 5–41)
AMPHETAMINE SCREEN, URINE: NEGATIVE
ANION GAP SERPL CALCULATED.3IONS-SCNC: 16 MMOL/L (ref 7–19)
AST SERPL-CCNC: 72 U/L (ref 5–40)
BARBITURATE SCREEN URINE: NEGATIVE
BENZODIAZEPINE SCREEN, URINE: NEGATIVE
BILIRUB SERPL-MCNC: <0.2 MG/DL (ref 0.2–1.2)
BILIRUBIN URINE: NEGATIVE
BLOOD, URINE: NEGATIVE
BUN BLDV-MCNC: 16 MG/DL (ref 6–20)
CALCIUM SERPL-MCNC: 9.9 MG/DL (ref 8.6–10)
CANNABINOID SCREEN URINE: NEGATIVE
CHLORIDE BLD-SCNC: 101 MMOL/L (ref 98–111)
CLARITY: CLEAR
CO2: 23 MMOL/L (ref 22–29)
COCAINE METABOLITE SCREEN URINE: NEGATIVE
COLOR: YELLOW
CREAT SERPL-MCNC: 0.7 MG/DL (ref 0.5–1.2)
ETHANOL: 163 MG/DL (ref 0–0.08)
GFR NON-AFRICAN AMERICAN: >60
GLUCOSE BLD-MCNC: 95 MG/DL (ref 74–109)
GLUCOSE URINE: NEGATIVE MG/DL
HCT VFR BLD CALC: 47.8 % (ref 42–52)
HEMOGLOBIN: 16.2 G/DL (ref 14–18)
KETONES, URINE: NEGATIVE MG/DL
LEUKOCYTE ESTERASE, URINE: NEGATIVE
Lab: NORMAL
MCH RBC QN AUTO: 35.4 PG (ref 27–31)
MCHC RBC AUTO-ENTMCNC: 33.9 G/DL (ref 33–37)
MCV RBC AUTO: 104.4 FL (ref 80–94)
NITRITE, URINE: NEGATIVE
OPIATE SCREEN URINE: NEGATIVE
PDW BLD-RTO: 12.5 % (ref 11.5–14.5)
PH UA: 5.5 (ref 5–8)
PLATELET # BLD: 205 K/UL (ref 130–400)
PMV BLD AUTO: 10.2 FL (ref 9.4–12.4)
POTASSIUM REFLEX MAGNESIUM: 4.8 MMOL/L (ref 3.5–5)
PROTEIN UA: NEGATIVE MG/DL
RBC # BLD: 4.58 M/UL (ref 4.7–6.1)
SODIUM BLD-SCNC: 140 MMOL/L (ref 136–145)
SPECIFIC GRAVITY UA: 1.02 (ref 1–1.03)
TOTAL PROTEIN: 7.8 G/DL (ref 6.6–8.7)
URINE REFLEX TO CULTURE: NORMAL
UROBILINOGEN, URINE: 0.2 E.U./DL
WBC # BLD: 7.7 K/UL (ref 4.8–10.8)

## 2019-05-08 PROCEDURE — 99283 EMERGENCY DEPT VISIT LOW MDM: CPT

## 2019-05-08 PROCEDURE — 81003 URINALYSIS AUTO W/O SCOPE: CPT

## 2019-05-08 PROCEDURE — 6370000000 HC RX 637 (ALT 250 FOR IP): Performed by: NURSE PRACTITIONER

## 2019-05-08 PROCEDURE — 96366 THER/PROPH/DIAG IV INF ADDON: CPT

## 2019-05-08 PROCEDURE — 80307 DRUG TEST PRSMV CHEM ANLYZR: CPT

## 2019-05-08 PROCEDURE — 2580000003 HC RX 258: Performed by: NURSE PRACTITIONER

## 2019-05-08 PROCEDURE — 36415 COLL VENOUS BLD VENIPUNCTURE: CPT

## 2019-05-08 PROCEDURE — G0480 DRUG TEST DEF 1-7 CLASSES: HCPCS

## 2019-05-08 PROCEDURE — 6360000002 HC RX W HCPCS: Performed by: NURSE PRACTITIONER

## 2019-05-08 PROCEDURE — 85027 COMPLETE CBC AUTOMATED: CPT

## 2019-05-08 PROCEDURE — 80053 COMPREHEN METABOLIC PANEL: CPT

## 2019-05-08 PROCEDURE — 2500000003 HC RX 250 WO HCPCS: Performed by: NURSE PRACTITIONER

## 2019-05-08 PROCEDURE — 96365 THER/PROPH/DIAG IV INF INIT: CPT

## 2019-05-08 RX ORDER — THIAMINE MONONITRATE (VIT B1) 100 MG
100 TABLET ORAL ONCE
Status: COMPLETED | OUTPATIENT
Start: 2019-05-08 | End: 2019-05-08

## 2019-05-08 RX ORDER — THIAMINE MONONITRATE (VIT B1) 100 MG
100 TABLET ORAL DAILY
Status: DISCONTINUED | OUTPATIENT
Start: 2019-05-09 | End: 2019-05-08

## 2019-05-08 RX ADMIN — Medication 100 MG: at 22:33

## 2019-05-08 RX ADMIN — FOLIC ACID: 5 INJECTION, SOLUTION INTRAMUSCULAR; INTRAVENOUS; SUBCUTANEOUS at 22:34

## 2019-05-09 VITALS
WEIGHT: 170 LBS | TEMPERATURE: 97.7 F | BODY MASS INDEX: 30.12 KG/M2 | OXYGEN SATURATION: 94 % | DIASTOLIC BLOOD PRESSURE: 94 MMHG | HEART RATE: 77 BPM | RESPIRATION RATE: 19 BRPM | HEIGHT: 63 IN | SYSTOLIC BLOOD PRESSURE: 129 MMHG

## 2019-05-09 LAB — ETHANOL: 68 MG/DL (ref 0–0.08)

## 2019-05-09 PROCEDURE — 36415 COLL VENOUS BLD VENIPUNCTURE: CPT

## 2019-05-09 PROCEDURE — G0480 DRUG TEST DEF 1-7 CLASSES: HCPCS

## 2019-05-09 PROCEDURE — 99283 EMERGENCY DEPT VISIT LOW MDM: CPT | Performed by: EMERGENCY MEDICINE

## 2019-05-09 PROCEDURE — 96366 THER/PROPH/DIAG IV INF ADDON: CPT

## 2019-05-09 ASSESSMENT — ENCOUNTER SYMPTOMS
RECTAL PAIN: 0
VOMITING: 0
DIARRHEA: 0
STRIDOR: 0
SHORTNESS OF BREATH: 0
CHEST TIGHTNESS: 0
ABDOMINAL DISTENTION: 0
CONSTIPATION: 0
BLOOD IN STOOL: 0
APNEA: 0
EYE DISCHARGE: 0
PHOTOPHOBIA: 0
ABDOMINAL PAIN: 0
COLOR CHANGE: 0
WHEEZING: 0
EYE PAIN: 0
EYE REDNESS: 0
SORE THROAT: 0
NAUSEA: 0
SINUS PAIN: 0
BACK PAIN: 0

## 2019-05-09 ASSESSMENT — LIFESTYLE VARIABLES: HISTORY_ALCOHOL_USE: YES

## 2019-05-09 ASSESSMENT — PAIN SCALES - GENERAL: PAINLEVEL_OUTOF10: 0

## 2019-05-09 NOTE — BH NOTE
go.      Duration of symptoms: Worsening over the last 2 weeks    Current Stressors: drug and alcohol    Current living arrangement:  Lives with cousin    SI:  denies   Plan: no   Past SI attempts: no   How many: 0  Dates or Ages:   Currently able to contract for safety outside hospital: yes   Describe: patient is not SI    C-SSRS Completed: yes    HI: denies  If yes describe:   Delusions: denies  If yes describe:   Hallucinations: denies   If yes describe:   Risk of Harm to self: no   If yes explain:   Was it within the past 6 months: no   Risk of Harm to others: no   If yes explain:   Was it within the past 6 months: no   Racing thoughts:no   Agoraphobia: no   Anxiety 1-10:  0  Explain if increased:   Depression 1-10:  0  Explain if increased:  Risk taking behaviors: no   If yes explain:  Level of function outside hospital decreased: no   If yes explain:       History of Psychiatric Treatment:     Previous Outpatient therapy: Yes  If yes where & when: Counseling with a psychologist at [facility]  Are you compliant with appointments: No  Psychiatric Hospitalizations: No   Where & When:   Previous Diagnosis:  no  Previous psychiatric medications: No   If yes list examples:   Are you compliant with medications: Yes     Violence and Trauma History:     History of violence by patient: no   If yes explain:   History of Trauma: no    If yes explain:   History of Abuse: no   If yes explain/by whom:     Family History:    Family history of mental illness: no   Family member & Diagnosis:  no  Family members with suicide attempt: no   If yes explain:   Family members who completed suicide: no  If yes explain:       Substance Abuse History:     SBIRT Completed: yes    Current ETOH LEVELS: see above    ETOH Abuse:   Age of first drink:  15   Date of last drink:   Amount drinking daily: heavy   Years of use:  10  Longest period of sobriety: more than two years  ETOH treatment history: yes   If yes describe: [facility] for 3 years  History of seizures, blackouts, etc. due to ETOH abuse: yes seizure during withdrawals  Family history of ETOH abuse: yes   Legal consequences of chemical use: yes     Substance/Chemical Abuse/Recreational Drug History:  Quit using in 2008  Substance used: alcohol, marijuana, cocaine and methamphetamines  Date of last substance use: 11 years ago   Substance treatment history: no  Family history of substance abuse: yes    Tobacco use:Yes If yes frequency 1 PPD /duration: 35 years  Patient states that he also sometimes vapes    Opiates: It was discussed with pt they would not be receiving opiates unless they were within 3 days post surgery/acute injury. Patient voiced understanding and agreed. Psychiatric Review Of Systems:     Recent Sleep changes: yes   Average hours per night: 4  With sleep aid: no   Restful sleep: no   Difficulty falling asleep: yes   Difficulty staying asleep: yes   Difficulty awakening: no   Nightmares:no    Recent appetite changes: no   Recent weight changes/Pounds gained (+) or lost (-): no        Energy level changes:  unchanged   Interest/pleasure/anhedonia:  yes     Medical History:     Medical Diagnosis/Issues: Hepatitis C and thyroid disease. CT today in ED:no  Use of 02 or CPAP: no  Ambulatory: yes  Independent Self Care: yes  Use of OTC: no   Somatic symptoms: no     PCP: Anabela Bravo MD     Current Medications:   Scheduled Meds: No current facility-administered medications for this encounter.      Current Outpatient Medications:     Apremilast (OTEZLA) 10 & 20 & 30 MG TBPK, , Disp: , Rfl:     naproxen (NAPROSYN) 500 MG tablet, Take 500 mg by mouth 2 times daily (with meals), Disp: , Rfl:     aspirin 81 MG EC tablet, Take 1 tablet by mouth daily, Disp: 100 tablet, Rfl: 0    calcitRIOL (ROCALTROL) 0.25 MCG capsule, Take 0.25 mcg by mouth three times daily , Disp: , Rfl:     omeprazole (PRILOSEC) 20 MG capsule, Take 20 mg by mouth daily, Disp: , Rfl:        Mental Status Evaluation:     Appearance:  disheveled and tattooed   Behavior:  Restless & fidgety   Speech:  normal pitch and normal volume   Mood:  anxious   Affect:  normal and mood-congruent   Thought Process:  circumstantial   Thought Content:  denies   Sensorium:  person, place, time/date, situation, day of week, month of year and year   Cognition:  grossly intact   Insight:  good   Judgment:  good     Social Information:    Education: 11th grade  Employment where   [employer]   Positive support system: No  Social Supports: no        Disposition:       1. Decision to admit to [facility]: no    If yes, which unit Adult or Geriatric Unit:    Is patient voluntary:  If no has a 72 hold been initiated:   Does the patient have a guardian:   Has the guardian been notified:   Admission Diagnosis:     2. Referral to IOP/PHP: yes     3. Decision to Discharge:   Does not meet criteria for acceptance to   unit due to: see above      Patient is instructed to follow up with his previously scheduled sober house with [facility] and to return to the ER if his symptoms return or worsen. All contraband is removed from patient's room by ER staff. Education on community resources is documented by [facility] AN [facility]FFILIATE OF [facility].   Richelle Pierce RN

## 2019-05-09 NOTE — ED PROVIDER NOTES
[address] [facility] [facility] EMERGENCY DEPT  eMERGENCYdEPARTMENT eNCOUnter      Pt Name: Cyrena Cushing  MRN: [id number]  [place] 1965  Date of evaluation: 5/8/2019  UZAIR Cook CNP    CHIEF COMPLAINT       Chief Complaint   Patient presents with    Alcohol Intoxication         HISTORY OF PRESENT ILLNESS  (Location/Symptom, Timing/Onset, Context/Setting, Quality, Duration, Modifying Factors, Severity.)   Cyrena Cushing is a 48 y.o. male who presents to the emergency department requesting help with alcoholism. Patient says he has chronic alcoholism and drinks approximately 6 beers per day on average. He says he has been to rehab twice and was unsuccessful. He says that he wants to get help so he can go back to rehab. He denies any suicidal ideation. He denies any depression, anxiety, hallucinations, or any acute psychiatric symptoms. He simply says that he wants to get help. He tells me that he has 2 beers prior to arrival here. He does have family at the bedside. He is cooperative. He tells me he has cirrhosis of the liver and is seeing GI at [facility] and had liver US ordered today but did not go. The history is provided by the patient. Nursing Notes were reviewed and I agree. REVIEW OF SYSTEMS    (2-9 systems for level 4, 10 or more for level 5)     Review of Systems   Constitutional: Negative for activity change, appetite change, chills, diaphoresis, fatigue and fever. HENT: Negative for congestion, ear pain, nosebleeds, sinus pain and sore throat. Eyes: Negative for photophobia, pain, discharge and redness. Respiratory: Negative for apnea, chest tightness, shortness of breath, wheezing and stridor. Cardiovascular: Negative for chest pain, palpitations and leg swelling. Gastrointestinal: Negative for abdominal distention, abdominal pain, blood in stool, constipation, diarrhea, nausea, rectal pain and vomiting.    Endocrine: Negative for cold intolerance, heat intolerance, polydipsia, polyphagia and polyuria. Genitourinary: Negative for decreased urine volume, difficulty urinating, dysuria, flank pain and urgency. Musculoskeletal: Negative for arthralgias, back pain, joint swelling, neck pain and neck stiffness. Skin: Negative for color change, pallor, rash and wound. Allergic/Immunologic: Negative for immunocompromised state. Neurological: Negative for dizziness, syncope, numbness and headaches. Hematological: Negative for adenopathy. Does not bruise/bleed easily. Psychiatric/Behavioral: Negative for agitation, behavioral problems and confusion. Wants help with alcoholism         Except as noted above the remainder of the review of systems was reviewed and negative.        PAST MEDICAL HISTORY     Past Medical History:   Diagnosis Date    Drug abuse, IV ([address]     History 1997    Hepatitis C     Hypercalcemia     Psoriasis     Thyroid disease     TIA (transient ischemic attack)     no weakness         SURGICAL HISTORY       Past Surgical History:   Procedure Laterality Date    FOOT SURGERY Right     SC OPEN RX DISTAL RADIUS FX, EXTRA-ARTICULAR Left 5/1/2018    DISTAL RADIAL OPEN REDUCTION INTERNAL FIXATION EXTRA - ARTICULAR LEFT CARPAL TUNNEL RELEASE performed by Violet Salcedo MD at [address] [place] [address]      ORIF 8/7/17 Dr. Rolo Ribera       Discharge Medication List as of 5/9/2019  3:02 AM      CONTINUE these medications which have NOT CHANGED    Details   Apremilast (OTEZLA) 10 & 20 & 30 MG TBPK Historical Med      naproxen (NAPROSYN) 500 MG tablet Take 500 mg by mouth 2 times daily (with meals)Historical Med      aspirin 81 MG EC tablet Take 1 tablet by mouth daily, Disp-100 tablet, R-0      calcitRIOL (ROCALTROL) 0.25 MCG capsule Take 0.25 mcg by mouth three times daily       omeprazole (PRILOSEC) 20 MG capsule Take 20 mg by mouth daily             ALLERGIES     Cephalexin and Sulfamethoxazole-trimethoprim    FAMILY HISTORY Family History   Problem Relation Age of Onset    Arthritis Mother           SOCIAL HISTORY       Social History     Socioeconomic History    Marital status: Single     Spouse name: None    Number of children: None    Years of education: None    Highest education level: None   Occupational History    None   Social Needs    Financial resource strain: None    Food insecurity:     Worry: None     Inability: None    Transportation needs:     Medical: None     Non-medical: None   Tobacco Use    Smoking status: Current Every Day Smoker     Packs/day: 0.50     Types: Cigarettes     Last attempt to quit: 1/16/2016     Years since quitting: 3.3    Smokeless tobacco: Never Used   Substance and Sexual Activity    Alcohol use: Yes     Comment: social     Drug use: Not Currently     Types: IV, Methamphetamines, Cocaine     Comment: history of ( 1997)    Sexual activity: None   Lifestyle    Physical activity:     Days per week: None     Minutes per session: None    Stress: None   Relationships    Social connections:     Talks on phone: None     Gets together: None     Attends Roman [employer] service: None     Active member of club or organization: None     Attends meetings of clubs or organizations: None     Relationship status: None    Intimate partner violence:     Fear of current or ex partner: None     Emotionally abused: None     Physically abused: None     Forced sexual activity: None   Other Topics Concern    None   Social History Narrative    None       SCREENINGS    [place] Coma Scale  Eye Opening: Spontaneous  Best Verbal Response: Oriented  Best Motor Response: Obeys commands  [place] Coma Scale Score: 15      PHYSICAL EXAM    (up to 7 forlevel 4, 8 or more for level 5)     ED Triage Vitals   BP Temp Temp Source Pulse Resp SpO2 Height Weight   05/08/19 2057 05/08/19 2057 05/08/19 2057 05/08/19 2057 05/08/19 2049 05/08/19 2057 05/08/19 2049 05/08/19 2049   (!) 122/57 98.5 °F (36.9 °C) Oral 80 20 93 % components:       Result Value    ALT 57 (*)     AST 72 (*)     All other components within normal limits   CBC - Abnormal; Notable for the following components:    RBC 4.58 (*)     .4 (*)     MCH 35.4 (*)     All other components within normal limits   ETHANOL   URINE DRUG SCREEN   URINE RT REFLEX TO CULTURE   ETHANOL       All other labs were within normal range or notreturned as of this dictation. RE-ASSESSMENT          EMERGENCY DEPARTMENT COURSE and DIFFERENTIAL DIAGNOSIS/MDM:   Vitals:    Vitals:    05/09/19 0000 05/09/19 0130 05/09/19 0153 05/09/19 0300   BP: 123/87 120/89  (!) 129/94   Pulse: 77 75 77 77   Resp: 20 16  19   Temp: 97.7 °F (36.5 °C)      TempSrc: Oral      SpO2: 94% 91%  94%   Weight:       Height:             MDM  Number of Diagnoses or Management Options  Diagnosis management comments: Patient presented to the emergency department requesting help for chronic alcoholism. He denied suicidal ideation or homicidal ideation or any acute psychiatric symptoms. A CBC, CMP, UA, and ETOH level were obtained and showed initial ETOH level of 163. Dr. Mira Mcmanus assumed care of this patient. PROCEDURES:    Procedures      FINAL IMPRESSION      1.  Alcoholism /alcohol abuse [facility])          DISPOSITION/PLAN   DISPOSITION        PATIENT REFERRED TO:  [facility] EMERGENCY DEPT  Pancho Davies  [phone]    As needed, If symptoms worsen    MD Rosalba Brown [phone]            DISCHARGE MEDICATIONS:  Discharge Medication List as of 5/9/2019  3:02 AM          (Please note that portions of this note were completed with a voice recognition program.  Efforts were made to edit the dictations but occasionallywords are mis-transcribed.)    UZAIR Vaughn - UZAIR Bashir - CNP  05/09/19 4415

## 2019-05-09 NOTE — ED PROVIDER NOTES
Attending Supervisory Note/Shared Visit   I have personally performed a face to face diagnostic evaluation on this patient. I have reviewed the mid-levels findings and agree. Briefly, patient's 61-year-old male who presents due to alcoholism. He drinks 6 beers per day on average. Has been to rehab before and is wanting to stop drinking again. No HI or SI. Denies depression. Marcia Terrym he just wants to stop drinking. Intoxicated slightly when he arrived. He is sober now. He has been seen by psychiatry intake and discussed with on-call psychiatrist who feels the patient is safe for discharge from a psychiatric standpoint. Patient resting comfortably. No signs of withdrawal. He's been given information for facilities to contact about treatment/detox programs. Patient warned about possibility of withdrawal and instructed not to stop drinking cold turkey. Told patient to follow-up for rehab/detox and return to the ER if he develops withdrawal symptoms or has any change or worsening symptoms or new concerns. Patient agreeable plan. FINAL IMPRESSION      1.  Alcoholism /alcohol abuse ([facility]          Amira Finn MD  Attending Emergency Physician      Amira Finn MD  05/09/19 1304

## 2019-05-09 NOTE — PROGRESS NOTES
Pt resting. No S/S of withdrawal noted at this time. Ethenol level at 68. Charge nurse made aware to call psych for evaluation.

## 2019-06-04 ENCOUNTER — TELEPHONE (OUTPATIENT)
Dept: GASTROENTEROLOGY | Facility: CLINIC | Age: 54
End: 2019-06-04

## 2019-06-04 NOTE — TELEPHONE ENCOUNTER
Sent reminder letter for Comprehensive Metabolic Panel , Iron Profile , Ferritin , Occult Blood X 3, Stool , HCV RT-PCR,Quant(Non-Graph) , Protime-INR and CBC (no diff)  US Liver  and US Elastography Paranchyma    Will follow up 6/18/19    Elvia Blum MA

## 2019-10-28 ENCOUNTER — TELEPHONE (OUTPATIENT)
Dept: GASTROENTEROLOGY | Facility: CLINIC | Age: 54
End: 2019-10-28

## 2019-10-28 NOTE — TELEPHONE ENCOUNTER
No response from past attempts to complete tests.     Tried to call. LVM for CB to my direct line.    Sent letter 3 out.     Will follow up 11/11/19    Elvia Blum MA

## 2020-05-18 ENCOUNTER — HOSPITAL ENCOUNTER (OUTPATIENT)
Age: 55
Setting detail: OBSERVATION
Discharge: OTHER INSTITUTION | End: 2020-05-21
Attending: EMERGENCY MEDICINE | Admitting: HOSPITALIST
Payer: MEDICAID

## 2020-05-18 PROBLEM — R41.82 AMS (ALTERED MENTAL STATUS): Status: ACTIVE | Noted: 2020-05-18

## 2020-05-18 PROBLEM — F10.939 ALCOHOL WITHDRAWAL (HCC): Status: ACTIVE | Noted: 2020-05-18

## 2020-05-18 PROBLEM — F19.131: Status: ACTIVE | Noted: 2020-05-18

## 2020-05-18 PROBLEM — B18.2 CHRONIC HEPATITIS C WITHOUT HEPATIC COMA (HCC): Status: ACTIVE | Noted: 2020-05-18

## 2020-05-18 LAB
ACETAMINOPHEN LEVEL: <15 UG/ML
ALBUMIN SERPL-MCNC: 4.8 G/DL (ref 3.5–5.2)
ALP BLD-CCNC: 67 U/L (ref 40–130)
ALT SERPL-CCNC: 99 U/L (ref 5–41)
AMPHETAMINE SCREEN, URINE: POSITIVE
ANION GAP SERPL CALCULATED.3IONS-SCNC: 19 MMOL/L (ref 7–19)
AST SERPL-CCNC: 99 U/L (ref 5–40)
BARBITURATE SCREEN URINE: NEGATIVE
BASOPHILS ABSOLUTE: 0 K/UL (ref 0–0.2)
BASOPHILS RELATIVE PERCENT: 0.5 % (ref 0–1)
BENZODIAZEPINE SCREEN, URINE: NEGATIVE
BILIRUB SERPL-MCNC: 1.1 MG/DL (ref 0.2–1.2)
BUN BLDV-MCNC: 30 MG/DL (ref 6–20)
CALCIUM SERPL-MCNC: 10.1 MG/DL (ref 8.6–10)
CANNABINOID SCREEN URINE: NEGATIVE
CHLORIDE BLD-SCNC: 95 MMOL/L (ref 98–111)
CO2: 22 MMOL/L (ref 22–29)
COCAINE METABOLITE SCREEN URINE: NEGATIVE
CREAT SERPL-MCNC: 1.2 MG/DL (ref 0.5–1.2)
EKG P AXIS: 59 DEGREES
EKG P-R INTERVAL: 130 MS
EKG Q-T INTERVAL: 336 MS
EKG QRS DURATION: 92 MS
EKG QTC CALCULATION (BAZETT): 446 MS
EKG T AXIS: 13 DEGREES
EOSINOPHILS ABSOLUTE: 0 K/UL (ref 0–0.6)
EOSINOPHILS RELATIVE PERCENT: 0 % (ref 0–5)
ETHANOL: 69 MG/DL (ref 0–0.08)
GFR NON-AFRICAN AMERICAN: >60
GLUCOSE BLD-MCNC: 94 MG/DL (ref 74–109)
HCT VFR BLD CALC: 43.6 % (ref 42–52)
HEMOGLOBIN: 15.3 G/DL (ref 14–18)
IMMATURE GRANULOCYTES #: 0 K/UL
LYMPHOCYTES ABSOLUTE: 1.1 K/UL (ref 1.1–4.5)
LYMPHOCYTES RELATIVE PERCENT: 13.6 % (ref 20–40)
Lab: ABNORMAL
MCH RBC QN AUTO: 31.9 PG (ref 27–31)
MCHC RBC AUTO-ENTMCNC: 35.1 G/DL (ref 33–37)
MCV RBC AUTO: 91 FL (ref 80–94)
MONOCYTES ABSOLUTE: 0.8 K/UL (ref 0–0.9)
MONOCYTES RELATIVE PERCENT: 9.3 % (ref 0–10)
NEUTROPHILS ABSOLUTE: 6.1 K/UL (ref 1.5–7.5)
NEUTROPHILS RELATIVE PERCENT: 76.2 % (ref 50–65)
OPIATE SCREEN URINE: NEGATIVE
PDW BLD-RTO: 13.9 % (ref 11.5–14.5)
PLATELET # BLD: 212 K/UL (ref 130–400)
PMV BLD AUTO: 10.1 FL (ref 9.4–12.4)
POTASSIUM SERPL-SCNC: 3.8 MMOL/L (ref 3.5–5)
RBC # BLD: 4.79 M/UL (ref 4.7–6.1)
SALICYLATE, SERUM: <3 MG/DL (ref 3–10)
SODIUM BLD-SCNC: 136 MMOL/L (ref 136–145)
TOTAL PROTEIN: 8.7 G/DL (ref 6.6–8.7)
WBC # BLD: 8.1 K/UL (ref 4.8–10.8)

## 2020-05-18 PROCEDURE — 93005 ELECTROCARDIOGRAM TRACING: CPT | Performed by: EMERGENCY MEDICINE

## 2020-05-18 PROCEDURE — 99285 EMERGENCY DEPT VISIT HI MDM: CPT

## 2020-05-18 PROCEDURE — 96374 THER/PROPH/DIAG INJ IV PUSH: CPT

## 2020-05-18 PROCEDURE — 36415 COLL VENOUS BLD VENIPUNCTURE: CPT

## 2020-05-18 PROCEDURE — G0480 DRUG TEST DEF 1-7 CLASSES: HCPCS

## 2020-05-18 PROCEDURE — G0378 HOSPITAL OBSERVATION PER HR: HCPCS

## 2020-05-18 PROCEDURE — 2500000003 HC RX 250 WO HCPCS: Performed by: PHYSICIAN ASSISTANT

## 2020-05-18 PROCEDURE — 6360000002 HC RX W HCPCS: Performed by: PHYSICIAN ASSISTANT

## 2020-05-18 PROCEDURE — 2580000003 HC RX 258: Performed by: PHYSICIAN ASSISTANT

## 2020-05-18 PROCEDURE — 6360000002 HC RX W HCPCS: Performed by: EMERGENCY MEDICINE

## 2020-05-18 PROCEDURE — 80307 DRUG TEST PRSMV CHEM ANLYZR: CPT

## 2020-05-18 PROCEDURE — 93010 ELECTROCARDIOGRAM REPORT: CPT | Performed by: INTERNAL MEDICINE

## 2020-05-18 PROCEDURE — 96376 TX/PRO/DX INJ SAME DRUG ADON: CPT

## 2020-05-18 PROCEDURE — 6370000000 HC RX 637 (ALT 250 FOR IP): Performed by: PHYSICIAN ASSISTANT

## 2020-05-18 PROCEDURE — 80053 COMPREHEN METABOLIC PANEL: CPT

## 2020-05-18 PROCEDURE — 85025 COMPLETE CBC W/AUTO DIFF WBC: CPT

## 2020-05-18 RX ORDER — SODIUM CHLORIDE 0.9 % (FLUSH) 0.9 %
10 SYRINGE (ML) INJECTION EVERY 12 HOURS SCHEDULED
Status: DISCONTINUED | OUTPATIENT
Start: 2020-05-18 | End: 2020-05-21 | Stop reason: HOSPADM

## 2020-05-18 RX ORDER — LORAZEPAM 2 MG/ML
3 INJECTION INTRAMUSCULAR
Status: DISCONTINUED | OUTPATIENT
Start: 2020-05-18 | End: 2020-05-19

## 2020-05-18 RX ORDER — LORAZEPAM 2 MG/ML
4 INJECTION INTRAMUSCULAR
Status: DISCONTINUED | OUTPATIENT
Start: 2020-05-18 | End: 2020-05-19

## 2020-05-18 RX ORDER — NICOTINE 21 MG/24HR
1 PATCH, TRANSDERMAL 24 HOURS TRANSDERMAL DAILY
Status: DISCONTINUED | OUTPATIENT
Start: 2020-05-18 | End: 2020-05-21 | Stop reason: HOSPADM

## 2020-05-18 RX ORDER — SODIUM CHLORIDE 0.9 % (FLUSH) 0.9 %
10 SYRINGE (ML) INJECTION PRN
Status: DISCONTINUED | OUTPATIENT
Start: 2020-05-18 | End: 2020-05-21 | Stop reason: HOSPADM

## 2020-05-18 RX ORDER — MAGNESIUM SULFATE IN WATER 40 MG/ML
2 INJECTION, SOLUTION INTRAVENOUS PRN
Status: DISCONTINUED | OUTPATIENT
Start: 2020-05-18 | End: 2020-05-21 | Stop reason: HOSPADM

## 2020-05-18 RX ORDER — LORAZEPAM 1 MG/1
4 TABLET ORAL
Status: DISCONTINUED | OUTPATIENT
Start: 2020-05-18 | End: 2020-05-19

## 2020-05-18 RX ORDER — LORAZEPAM 2 MG/ML
1 INJECTION INTRAMUSCULAR
Status: DISCONTINUED | OUTPATIENT
Start: 2020-05-18 | End: 2020-05-21 | Stop reason: HOSPADM

## 2020-05-18 RX ORDER — LORAZEPAM 1 MG/1
3 TABLET ORAL
Status: DISCONTINUED | OUTPATIENT
Start: 2020-05-18 | End: 2020-05-19

## 2020-05-18 RX ORDER — PANTOPRAZOLE SODIUM 40 MG/1
40 TABLET, DELAYED RELEASE ORAL
Status: DISCONTINUED | OUTPATIENT
Start: 2020-05-19 | End: 2020-05-21 | Stop reason: HOSPADM

## 2020-05-18 RX ORDER — SODIUM CHLORIDE 9 MG/ML
INJECTION, SOLUTION INTRAVENOUS CONTINUOUS
Status: DISCONTINUED | OUTPATIENT
Start: 2020-05-18 | End: 2020-05-20

## 2020-05-18 RX ORDER — LORAZEPAM 1 MG/1
2 TABLET ORAL
Status: DISCONTINUED | OUTPATIENT
Start: 2020-05-18 | End: 2020-05-21 | Stop reason: HOSPADM

## 2020-05-18 RX ORDER — ACETAMINOPHEN 325 MG/1
650 TABLET ORAL EVERY 6 HOURS PRN
Status: DISCONTINUED | OUTPATIENT
Start: 2020-05-18 | End: 2020-05-21 | Stop reason: HOSPADM

## 2020-05-18 RX ORDER — LORAZEPAM 1 MG/1
1 TABLET ORAL
Status: DISCONTINUED | OUTPATIENT
Start: 2020-05-18 | End: 2020-05-21 | Stop reason: HOSPADM

## 2020-05-18 RX ORDER — POTASSIUM CHLORIDE 7.45 MG/ML
10 INJECTION INTRAVENOUS PRN
Status: DISCONTINUED | OUTPATIENT
Start: 2020-05-18 | End: 2020-05-21 | Stop reason: HOSPADM

## 2020-05-18 RX ORDER — LORAZEPAM 2 MG/ML
2 INJECTION INTRAMUSCULAR ONCE
Status: COMPLETED | OUTPATIENT
Start: 2020-05-18 | End: 2020-05-18

## 2020-05-18 RX ORDER — POTASSIUM CHLORIDE 20 MEQ/1
40 TABLET, EXTENDED RELEASE ORAL PRN
Status: DISCONTINUED | OUTPATIENT
Start: 2020-05-18 | End: 2020-05-21 | Stop reason: HOSPADM

## 2020-05-18 RX ORDER — ONDANSETRON 2 MG/ML
4 INJECTION INTRAMUSCULAR; INTRAVENOUS EVERY 6 HOURS PRN
Status: DISCONTINUED | OUTPATIENT
Start: 2020-05-18 | End: 2020-05-21 | Stop reason: HOSPADM

## 2020-05-18 RX ORDER — ACETAMINOPHEN 650 MG/1
650 SUPPOSITORY RECTAL EVERY 6 HOURS PRN
Status: DISCONTINUED | OUTPATIENT
Start: 2020-05-18 | End: 2020-05-21 | Stop reason: HOSPADM

## 2020-05-18 RX ORDER — PROMETHAZINE HYDROCHLORIDE 25 MG/1
12.5 TABLET ORAL EVERY 6 HOURS PRN
Status: DISCONTINUED | OUTPATIENT
Start: 2020-05-18 | End: 2020-05-21 | Stop reason: HOSPADM

## 2020-05-18 RX ORDER — LORAZEPAM 2 MG/ML
2 INJECTION INTRAMUSCULAR
Status: DISCONTINUED | OUTPATIENT
Start: 2020-05-18 | End: 2020-05-19

## 2020-05-18 RX ADMIN — SODIUM CHLORIDE: 9 INJECTION, SOLUTION INTRAVENOUS at 16:22

## 2020-05-18 RX ADMIN — LORAZEPAM 3 MG: 2 INJECTION INTRAMUSCULAR; INTRAVENOUS at 22:51

## 2020-05-18 RX ADMIN — LORAZEPAM 2 MG: 2 INJECTION INTRAMUSCULAR; INTRAVENOUS at 20:49

## 2020-05-18 RX ADMIN — LORAZEPAM 2 MG: 2 INJECTION INTRAMUSCULAR; INTRAVENOUS at 10:17

## 2020-05-18 RX ADMIN — LORAZEPAM 2 MG: 2 INJECTION INTRAMUSCULAR; INTRAVENOUS at 19:41

## 2020-05-18 RX ADMIN — THIAMINE HYDROCHLORIDE: 100 INJECTION, SOLUTION INTRAMUSCULAR; INTRAVENOUS at 17:27

## 2020-05-18 NOTE — H&P
nose, throat without exudate  Neck: no adenopathy, no carotid bruit, no JVD, supple, symmetrical, trachea midline   Lungs: clear to auscultation bilaterally,no rales or wheezes   Heart:tachycardic, regular rhythm, S1, S2 normal, no murmur  Abdomen:soft, non-tender; non-distended, normal bowel sounds no masses, no organomegaly  Extremities:No lower extremity edema,  No erythema, no tenderness to palpation  Skin: Skin color, texture, turgor normal. No rashes or lesions  Lymphatic: No palpable lymph node enlargment  Neurologic: Alert and oriented X 2-knows self and year but not place, normal strength and tone. No focal deficits  Psychiatric: Pressured/tangential speech. Denies SI/HI. Appears anxious/fidgety     Diagnostic Data:  CBC:  Recent Labs     05/18/20  1010   WBC 8.1   HGB 15.3   HCT 43.6        BMP:  Recent Labs     05/18/20  1010      K 3.8   CL 95*   CO2 22   BUN 30*   CREATININE 1.2   CALCIUM 10.1*     Recent Labs     05/18/20  1010   AST 99*   ALT 99*   BILITOT 1.1   ALKPHOS 67     Assessment/Plan:  Principal Problem:    AMS (altered mental status) 2/2 metabolic encephalopathy due to substance abuse-observe and treat withdrawal symptoms.  Psychiatry to evaluate again tomorrow    Active Problems:    Gastroesophageal reflux disease without esophagitis-PPI      Alcohol withdrawal (HCC)-[facility] protocol       Substance abuse withdrawal, with delirium (HCC)-noted, counseled on cessation from illicit drug use      Chronic hepatitis C without hepatic coma (HCC)-has been seen as OP by GI at [facility] but has failed to follow up  \  Further orders per clinical course/attending     Signed:  Janise Closs, PA-C

## 2020-05-18 NOTE — PROGRESS NOTES
4 Eyes Skin Assessment    Kvng Galan is being assessed upon: Admission    I agree that I, Cruz Alcantara, along with Enriqueta Ferguson (either 2 RN's or 1 LPN and 1 RN) have performed a thorough Head to Toe Skin Assessment on the patient. ALL assessment sites listed below have been assessed. Areas assessed by both nurses:     [x]   Head, Face, and Ears   [x]   Shoulders, Back, and Chest  [x]   Arms, Elbows, and Hands   [x]   Coccyx, Sacrum, and Ischium  [x]   Legs, Feet, and Heels    Does the Patient have Skin Breakdown?  No    Dangelo Prevention initiated: Yes  Wound Care Orders initiated: No    WOC nurse consulted for Pressure Injury (Stage 3,4, Unstageable, DTI, NWPT, and Complex wounds) and New or Established Ostomies: NA        Primary Nurse eSignature: Cruz Alcantara RN on 5/18/2020 at 2:11 PM      Co-Signer eSignature: Electronically signed by Enriqueta Ferguson RN on 5/18/20 at 2:15 PM CDT

## 2020-05-18 NOTE — PROGRESS NOTES
London Diaz arrived to room # 313. Presented with: overdose, methamphetamines  Mental Status: Patient is disoriented and alert. Vitals:    05/18/20 1409   BP: 123/78   Pulse: 107   Resp: 20   Temp: 97 °F (36.1 °C)   SpO2: 96%     Patient safety contract and falls prevention contract reviewed with patient No.  Oriented Patient to room. Call light within reach. Yes.   Needs, issues or concerns expressed at this time: no.      Electronically signed by Sam Gonzalez RN on 5/18/2020 at 2:11 PM

## 2020-05-18 NOTE — ED PROVIDER NOTES
UT OPEN RX DISTAL RADIUS FX, EXTRA-ARTICULAR Left 2018    DISTAL RADIAL OPEN REDUCTION INTERNAL FIXATION EXTRA - ARTICULAR LEFT CARPAL TUNNEL RELEASE performed by Amanda Houston MD at [address] [place] [address]      ORIF 17 Dr. Natali Harrington     Discharge Medication List as of 2020 10:20 AM      CONTINUE these medications which have NOT CHANGED    Details   Apremilast (OTEZLA) 10 & 20 & 30 MG TBPK Historical Med      omeprazole (PRILOSEC) 20 MG capsule Take 20 mg by mouth daily             ALLERGIES     Cephalexin and Sulfamethoxazole-trimethoprim    FAMILY HISTORY       Family History   Problem Relation Age of Onset    Arthritis Mother           SOCIAL HISTORY       Social History     Socioeconomic History    Marital status: Single     Spouse name: None    Number of children: None    Years of education: None    Highest education level: None   Occupational History    None   Social Needs    Financial resource strain: None    Food insecurity     Worry: None     Inability: None    Transportation needs     Medical: None     Non-medical: None   Tobacco Use    Smoking status: Current Every Day Smoker     Packs/day: 0.50     Types: Cigarettes     Last attempt to quit: 2016     Years since quittin.3    Smokeless tobacco: Never Used   Substance and Sexual Activity    Alcohol use: Yes     Comment: 6-8 beers a day    Drug use: Yes     Types: IV, Methamphetamines, Cocaine     Comment: history of ( )    Sexual activity: Not Currently     Partners: Female   Lifestyle    Physical activity     Days per week: None     Minutes per session: None    Stress: None   Relationships    Social connections     Talks on phone: None     Gets together: None     Attends Worship service: None     Active member of club or organization: None     Attends meetings of clubs or organizations: None     Relationship status: None    Intimate partner violence     Fear of current

## 2020-05-19 LAB
ALBUMIN SERPL-MCNC: 3.8 G/DL (ref 3.5–5.2)
ALP BLD-CCNC: 47 U/L (ref 40–130)
ALT SERPL-CCNC: 73 U/L (ref 5–41)
ANION GAP SERPL CALCULATED.3IONS-SCNC: 13 MMOL/L (ref 7–19)
AST SERPL-CCNC: 73 U/L (ref 5–40)
BILIRUB SERPL-MCNC: 1.3 MG/DL (ref 0.2–1.2)
BUN BLDV-MCNC: 22 MG/DL (ref 6–20)
CALCIUM SERPL-MCNC: 8.4 MG/DL (ref 8.6–10)
CHLORIDE BLD-SCNC: 103 MMOL/L (ref 98–111)
CO2: 24 MMOL/L (ref 22–29)
CREAT SERPL-MCNC: 0.8 MG/DL (ref 0.5–1.2)
GFR NON-AFRICAN AMERICAN: >60
GLUCOSE BLD-MCNC: 86 MG/DL (ref 74–109)
HCT VFR BLD CALC: 39.3 % (ref 42–52)
HEMOGLOBIN: 13 G/DL (ref 14–18)
MCH RBC QN AUTO: 32.3 PG (ref 27–31)
MCHC RBC AUTO-ENTMCNC: 33.1 G/DL (ref 33–37)
MCV RBC AUTO: 97.8 FL (ref 80–94)
PDW BLD-RTO: 13.8 % (ref 11.5–14.5)
PHOSPHORUS: 3.4 MG/DL (ref 2.5–4.5)
PLATELET # BLD: 162 K/UL (ref 130–400)
PMV BLD AUTO: 9.8 FL (ref 9.4–12.4)
POTASSIUM REFLEX MAGNESIUM: 4 MMOL/L (ref 3.5–5)
RBC # BLD: 4.02 M/UL (ref 4.7–6.1)
SODIUM BLD-SCNC: 140 MMOL/L (ref 136–145)
TOTAL PROTEIN: 5.9 G/DL (ref 6.6–8.7)
WBC # BLD: 6.4 K/UL (ref 4.8–10.8)

## 2020-05-19 PROCEDURE — 85027 COMPLETE CBC AUTOMATED: CPT

## 2020-05-19 PROCEDURE — 6370000000 HC RX 637 (ALT 250 FOR IP): Performed by: PHYSICIAN ASSISTANT

## 2020-05-19 PROCEDURE — 80053 COMPREHEN METABOLIC PANEL: CPT

## 2020-05-19 PROCEDURE — 36415 COLL VENOUS BLD VENIPUNCTURE: CPT

## 2020-05-19 PROCEDURE — 96372 THER/PROPH/DIAG INJ SC/IM: CPT

## 2020-05-19 PROCEDURE — 84100 ASSAY OF PHOSPHORUS: CPT

## 2020-05-19 PROCEDURE — 2580000003 HC RX 258: Performed by: PHYSICIAN ASSISTANT

## 2020-05-19 PROCEDURE — 6360000002 HC RX W HCPCS: Performed by: PHYSICIAN ASSISTANT

## 2020-05-19 PROCEDURE — 2500000003 HC RX 250 WO HCPCS: Performed by: PHYSICIAN ASSISTANT

## 2020-05-19 PROCEDURE — G0378 HOSPITAL OBSERVATION PER HR: HCPCS

## 2020-05-19 PROCEDURE — 96376 TX/PRO/DX INJ SAME DRUG ADON: CPT

## 2020-05-19 RX ADMIN — PANTOPRAZOLE SODIUM 40 MG: 40 TABLET, DELAYED RELEASE ORAL at 06:05

## 2020-05-19 RX ADMIN — SODIUM CHLORIDE: 9 INJECTION, SOLUTION INTRAVENOUS at 06:09

## 2020-05-19 RX ADMIN — LORAZEPAM 2 MG: 2 INJECTION INTRAMUSCULAR; INTRAVENOUS at 06:10

## 2020-05-19 RX ADMIN — THIAMINE HYDROCHLORIDE: 100 INJECTION, SOLUTION INTRAMUSCULAR; INTRAVENOUS at 08:41

## 2020-05-19 RX ADMIN — ENOXAPARIN SODIUM 40 MG: 40 INJECTION SUBCUTANEOUS at 08:41

## 2020-05-19 RX ADMIN — LORAZEPAM 2 MG: 2 INJECTION INTRAMUSCULAR; INTRAVENOUS at 08:38

## 2020-05-19 NOTE — PROGRESS NOTES
[facility] Hospitalists    Patient:  Jaziel Moon  YOB: 1965  Date of Service: 5/19/2020  MRN: [id number]   Acct: [de-identified]   Primary Care Physician: Rachel Royal MD  Advance Directive: Full Code  Admit Date: 5/18/2020       Hospital Day: 0  Portions of this note have been copied forward, however, changed to reflect the most current clinical status of this patient    CHIEF COMPLAINT Altered mental status    SUBJECTIVE:  Mr. Bañuelos Sees complains of tremulousness. He remains confused at times. Denies chest pain, heart palpitations, dyspnea. Cumulative Hospital Course: The patient is a 50 y.o. male with PMH alcohol/methamphetamine use, Hepatitis C, GERD who presented to [address] [place] [address] ED with altered mental status. Mr. Bañuelos Sees admits to chronic methamphetamine use and states he has been eating it over the last few days. Endorses daily alcohol abuse with history of alcohol withdrawal. Last drink was morning of admission. Drinks at least 6 beers daily but usually 8-12 in addition to Giron. Endorses visual/auditory hallucinations. Denies suicidal/homicidal ideation. Denies dyspnea, chest pain, heart palpitations, N/V, abdominal pain, diarrhea or constipation. Admitted to Hospitalist service, placed on [facility] protocol    Review of Systems:   14 point review of systems is negative except as specifically addressed above. Objective:   VITALS:  /89   Pulse 92   Temp 97.3 °F (36.3 °C) (Temporal)   Resp 20   Ht 5' 3\" (1.6 m)   Wt 159 lb 3.2 oz (72.2 kg)   SpO2 94%   BMI 28.20 kg/m²   24HR INTAKE/OUTPUT:      Intake/Output Summary (Last 24 hours) at 5/19/2020 1311  Last data filed at 5/19/2020 0434  Gross per 24 hour   Intake 2410 ml   Output 400 ml   Net 2010 ml     General appearance: Somnolent but easily awakened, appears stated age, cooperative and no distress  Head: Normocephalic, without obvious abnormality, atraumatic  Eyes: conjunctivae/corneas clear. PERRL, EOM's intact.    Ears: normal external ears and nose, throat without exudate  Neck: no adenopathy, no carotid bruit, no JVD, supple, symmetrical, trachea midline   Lungs: clear to auscultation bilaterally,no rales or wheezes   Heart: regular rate and rhythm, S1, S2 normal, no murmur  Abdomen:soft, non-tender; non-distended, normal bowel sounds no masses, no organomegaly  Extremities:No lower extremity edema,  No erythema, no tenderness to palpation  Skin: Skin color, texture, turgor normal. No rashes or lesions  Lymphatic: No palpable lymph node enlargment  Neurologic: Somnolent but easily awakened and oriented X 3, normal strength.  Mild/moderate BUE tremor, no focal deficits  Psychiatric: Agitated at times, denies SI/HI    Medications:      sodium chloride 100 mL/hr at 05/19/20 0609      sodium chloride flush  10 mL Intravenous 2 times per day    enoxaparin  40 mg Subcutaneous Daily    folic acid, thiamine, multi-vitamin with vitamin K infusion   Intravenous Daily    pantoprazole  40 mg Oral QAM AC    nicotine  1 patch Transdermal Daily     sodium chloride flush, acetaminophen **OR** acetaminophen, promethazine **OR** ondansetron, potassium chloride **OR** potassium alternative oral replacement **OR** potassium chloride, magnesium sulfate, magnesium hydroxide, LORazepam **OR** LORazepam **OR** LORazepam **OR** LORazepam **OR** [DISCONTINUED] LORazepam **OR** [DISCONTINUED] LORazepam **OR** [DISCONTINUED] LORazepam **OR** [DISCONTINUED] LORazepam  DIET GENERAL;     Lab and other Data:     Recent Labs     05/18/20  1010 05/19/20  0538   WBC 8.1 6.4   HGB 15.3 13.0*    162     Recent Labs     05/18/20  1010 05/19/20  0538    140   K 3.8 4.0   CL 95* 103   CO2 22 24   BUN 30* 22*   CREATININE 1.2 0.8   GLUCOSE 94 86     Recent Labs     05/18/20  1010 05/19/20  0538   AST 99* 73*   ALT 99* 73*   BILITOT 1.1 1.3*   ALKPHOS 67 47       Assessment/Plan   Principal Problem:    AMS (altered mental status) 2/2 metabolic/toxic encephalopathy due to substance abuse/alcohol withdrawal    Active Problems:    Gastroesophageal reflux disease without esophagitis-PPI      Alcohol withdrawal (HCC)-Continue CIWA scale, received 2 mg IV Ativan 1702      Substance abuse withdrawal, with delirium (HCC)-improving      Chronic hepatitis C without hepatic coma (HCC)-OP GI follow up    Continue CIWA scale. Await Psychiatry recommendations.  Will admit to their service when medically stable and no further signs alcohol withdrawal    DVT Prophylaxis: Lovenox    GI prophylaxis:  Protonix    Janise Closs, PA-C

## 2020-05-20 LAB
ALBUMIN SERPL-MCNC: 3.4 G/DL (ref 3.5–5.2)
ALP BLD-CCNC: 97 U/L (ref 40–130)
ALT SERPL-CCNC: 8 U/L (ref 5–41)
ANION GAP SERPL CALCULATED.3IONS-SCNC: 12 MMOL/L (ref 7–19)
AST SERPL-CCNC: 9 U/L (ref 5–40)
BILIRUB SERPL-MCNC: 0.4 MG/DL (ref 0.2–1.2)
BUN BLDV-MCNC: 21 MG/DL (ref 6–20)
CALCIUM SERPL-MCNC: 9.3 MG/DL (ref 8.6–10)
CHLORIDE BLD-SCNC: 106 MMOL/L (ref 98–111)
CO2: 22 MMOL/L (ref 22–29)
CREAT SERPL-MCNC: 1.4 MG/DL (ref 0.5–1.2)
GFR NON-AFRICAN AMERICAN: 53
GLUCOSE BLD-MCNC: 110 MG/DL (ref 74–109)
POTASSIUM REFLEX MAGNESIUM: 4.1 MMOL/L (ref 3.5–5)
SODIUM BLD-SCNC: 140 MMOL/L (ref 136–145)
TOTAL PROTEIN: 6.5 G/DL (ref 6.6–8.7)

## 2020-05-20 PROCEDURE — 80053 COMPREHEN METABOLIC PANEL: CPT

## 2020-05-20 PROCEDURE — 36415 COLL VENOUS BLD VENIPUNCTURE: CPT

## 2020-05-20 PROCEDURE — G0378 HOSPITAL OBSERVATION PER HR: HCPCS

## 2020-05-20 PROCEDURE — 6370000000 HC RX 637 (ALT 250 FOR IP): Performed by: PHYSICIAN ASSISTANT

## 2020-05-20 PROCEDURE — 6360000002 HC RX W HCPCS: Performed by: PHYSICIAN ASSISTANT

## 2020-05-20 PROCEDURE — 6370000000 HC RX 637 (ALT 250 FOR IP): Performed by: HOSPITALIST

## 2020-05-20 PROCEDURE — 2500000003 HC RX 250 WO HCPCS: Performed by: PHYSICIAN ASSISTANT

## 2020-05-20 PROCEDURE — 2580000003 HC RX 258: Performed by: PHYSICIAN ASSISTANT

## 2020-05-20 PROCEDURE — 96372 THER/PROPH/DIAG INJ SC/IM: CPT

## 2020-05-20 PROCEDURE — 99243 OFF/OP CNSLTJ NEW/EST LOW 30: CPT | Performed by: PSYCHIATRY & NEUROLOGY

## 2020-05-20 RX ORDER — THIAMINE MONONITRATE (VIT B1) 100 MG
100 TABLET ORAL DAILY
Status: DISCONTINUED | OUTPATIENT
Start: 2020-05-20 | End: 2020-05-21 | Stop reason: HOSPADM

## 2020-05-20 RX ORDER — FOLIC ACID 1 MG/1
1 TABLET ORAL DAILY
Status: DISCONTINUED | OUTPATIENT
Start: 2020-05-20 | End: 2020-05-21 | Stop reason: HOSPADM

## 2020-05-20 RX ADMIN — PANTOPRAZOLE SODIUM 40 MG: 40 TABLET, DELAYED RELEASE ORAL at 05:20

## 2020-05-20 RX ADMIN — Medication 100 MG: at 10:35

## 2020-05-20 RX ADMIN — Medication 10 ML: at 08:05

## 2020-05-20 RX ADMIN — Medication 10 ML: at 22:05

## 2020-05-20 RX ADMIN — FOLIC ACID 1 MG: 1 TABLET ORAL at 10:35

## 2020-05-20 RX ADMIN — ENOXAPARIN SODIUM 40 MG: 40 INJECTION SUBCUTANEOUS at 08:04

## 2020-05-20 RX ADMIN — THIAMINE HYDROCHLORIDE: 100 INJECTION, SOLUTION INTRAMUSCULAR; INTRAVENOUS at 08:05

## 2020-05-20 NOTE — PLAN OF CARE
Problem: Infection:  Goal: Will remain free from infection  Description: Will remain free from infection  5/20/2020 0326 by Robin Dailey RN  Outcome: Ongoing  5/19/2020 1820 by Benito Diaz LPN  Outcome: Ongoing     Problem: Safety:  Goal: Free from accidental physical injury  Description: Free from accidental physical injury  5/20/2020 0326 by Robin Dailey RN  Outcome: Ongoing  5/19/2020 1820 by Benito Diaz LPN  Outcome: Ongoing  Goal: Free from intentional harm  Description: Free from intentional harm  5/20/2020 0326 by Robin Dailey RN  Outcome: Ongoing  5/19/2020 1820 by Benito Diaz LPN  Outcome: Ongoing  Goal: Ability to remain free from injury will improve  Description: Ability to remain free from injury will improve  5/20/2020 0326 by Robin Dailey RN  Outcome: Ongoing  5/19/2020 1820 by Benito Diaz LPN  Outcome: Ongoing     Problem: Daily Care:  Goal: Daily care needs are met  Description: Daily care needs are met  5/20/2020 0326 by Robin Dailey RN  Outcome: Ongoing  5/19/2020 1820 by Benito Diaz LPN  Outcome: Ongoing     Problem: Pain:  Goal: Patient's pain/discomfort is manageable  Description: Patient's pain/discomfort is manageable  5/20/2020 0326 by Robin Dailey RN  Outcome: Ongoing  5/19/2020 1820 by Benito Diaz LPN  Outcome: Ongoing     Problem: Skin Integrity:  Goal: Skin integrity will stabilize  Description: Skin integrity will stabilize  5/20/2020 0326 by Robin Dailey RN  Outcome: Ongoing  5/19/2020 1820 by Benito Diaz LPN  Outcome: Ongoing     Problem: Discharge Planning:  Goal: Patients continuum of care needs are met  Description: Patients continuum of care needs are met  5/20/2020 0326 by Robin Dailey RN  Outcome: Ongoing  5/19/2020 1820 by Benito Diaz LPN  Outcome: Ongoing     Problem: Falls - Risk of:  Goal: Will remain free from falls  Description: Will remain free from falls  5/20/2020 5255 by Jarred Nunes RN  Outcome: Ongoing  5/19/2020 1820 by Kaye Cuellar LPN  Outcome: Ongoing  Goal: Absence of physical injury  Description: Absence of physical injury  5/20/2020 0326 by Jarred Nunes RN  Outcome: Ongoing  5/19/2020 1820 by Kaye Cuellar LPN  Outcome: Ongoing     Problem: Coping:  Goal: Ability to remain calm will improve  Description: Ability to remain calm will improve  5/20/2020 0326 by Jarred Nunes RN  Outcome: Ongoing  5/19/2020 1820 by Kaye Cuellar LPN  Outcome: Ongoing     Problem: Self-Care:  Goal: Ability to participate in self-care as condition permits will improve  Description: Ability to participate in self-care as condition permits will improve  5/20/2020 0326 by Jarred Nunes RN  Outcome: Ongoing  5/19/2020 1820 by Kaye Cuellar LPN  Outcome: Ongoing     Problem: Anxiety:  Goal: Level of anxiety will decrease  Description: Level of anxiety will decrease  5/20/2020 0326 by Jarred Nunes RN  Outcome: Ongoing  5/19/2020 1820 by Kaye Cuellar LPN  Outcome: Ongoing     Problem: Confusion - Acute:  Goal: Absence of continued neurological deterioration signs and symptoms  Description: Absence of continued neurological deterioration signs and symptoms  5/20/2020 0326 by Jarred Nunes RN  Outcome: Ongoing  5/19/2020 1820 by Kaye Cuellar LPN  Outcome: Ongoing  Goal: Mental status will be restored to baseline  Description: Mental status will be restored to baseline  5/20/2020 0326 by Jarred Nunes RN  Outcome: Ongoing  5/19/2020 1820 by Kaye Cuellar LPN  Outcome: Ongoing     Problem: Discharge Planning:  Goal: Ability to perform activities of daily living will improve  Description: Ability to perform activities of daily living will improve  5/20/2020 0326 by Jarred Nunes RN  Outcome: Ongoing  5/19/2020 1820 by Kaye Cuellar LPN  Outcome: Ongoing  Goal: Participates in care planning  Description: Participates in sensory misperception frequency  5/20/2020 0326 by Mirta Hall RN  Outcome: Ongoing  5/19/2020 1820 by Sloane Mejia LPN  Outcome: Ongoing  Goal: Able to refrain from responding to false sensory perceptions  Description: Able to refrain from responding to false sensory perceptions  5/20/2020 0326 by Mirta Hall RN  Outcome: Ongoing  5/19/2020 1820 by Sloane Mejia LPN  Outcome: Ongoing  Goal: Demonstrates accurate environmental perceptions  Description: Demonstrates accurate environmental perceptions  5/20/2020 0326 by Mirta Hall RN  Outcome: Ongoing  5/19/2020 1820 by Sloane Mejia LPN  Outcome: Ongoing  Goal: Able to distinguish between reality-based and nonreality-based thinking  Description: Able to distinguish between reality-based and nonreality-based thinking  5/20/2020 0326 by Mirta Hall RN  Outcome: Ongoing  5/19/2020 1820 by Sloane Mejia LPN  Outcome: Ongoing  Goal: Able to interrupt nonreality-based thinking  Description: Able to interrupt nonreality-based thinking  5/20/2020 0326 by Mirta Hall RN  Outcome: Ongoing  5/19/2020 1820 by Sloane Mejia LPN  Outcome: Ongoing     Problem: Sleep Pattern Disturbance:  Goal: Appears well-rested  Description: Appears well-rested  5/20/2020 0326 by Mirta Hall RN  Outcome: Ongoing  5/19/2020 1820 by Sloane Mejia LPN  Outcome: Ongoing   Electronically signed by Mirta Hall RN on 5/20/2020 at 3:26 AM

## 2020-05-20 NOTE — PROGRESS NOTES
Spoke with patient this morning about discharge planning. Patient is agreeable to referrals for inpatient drug and alcohol rehabilitation.      Electronically signed by Yunior Duval RN on 5/20/2020 at 1:41 PM

## 2020-05-20 NOTE — CONSULTS
[facility]  Psychiatry Consult    Reason for Consult: Concern   Reevaluation for inpatient psych admission    The primary source(s) of information include(s):  Patient    The patient is a 50 y.o. male with previous psychiatric history of alcohol use disorder, who has been admitted to medical services, due to altered mental status, secondary to drug and alcohol intoxication, with BAL of 69. Patient has been seen in his room with presence of one-to-one sitter. He reported that a reason for his current hopsital admission is \"I took too much meth\". Patient stated that ambulance brought patient to ER, because \"I afraid to die after I used meth\". Patient reported long history of drinking alcohol. Reported history of 3 previous rehabilitation treatment for alcohol use disorder, stated that last time when he was involved in rehab treatment is 3 years ago. He stated that he has been drinking during the last 3 years daily, half of the pint of whiskey and 6- 8 beers 16 ounce, together. He endorses history of complicated withdrawal symptoms from alcohol with withdrawal seizures. Patient reported history of using methamphetamine, stated that he uses methamphetamine once every 3-4 months. Patient said \"I was on vacation last week and I used methamphetamine on Saturday\" (5 days ago). Patient stated \"I got 2 g of the meth. I looked at it and I wanted to take that drug out of my face, so I took it all\". When I asked patient if he took drugs with suicidal intent, he responded \"No.  I used only 2 g. If I would take more, it would be suicide\". Patient has been confused at time of evaluation, he was oriented only in person information and current location. Patient stated that he does not know current day, date and stated that today is year of 2000. During the interview, patient denies feeling of depression, endorses feeling of anxiety and irritability.   He reported having withdrawal Behavior: Anxious, partially cooperative with interview. Mild psychomotor agitation appreciated. Speech: Slightly increased in tone, decrease in volume. No pressured speech noted. Mood: \"Tired\"   Affect: Mood congruent. Range is restricted. Thought Process: Mostly tangential  Thought Content: Patient does not have any current active suicidal and   homicidal ideations. No overt delusions or paranoia appreciated. Perceptions: Seems patient does not have any auditory or visual hallucinations at present time. Patient did not appear to be responding to internal stimuli. Executive Functions: Appear impaired. Concentration:  poor. Reasoning: Appears impaired based on interaction from interview   Orientation: To personal information and current situation. Confused   Impulsivity: Limited  Neurovegitative: Decreased appetite, decreased sleep  Insight: Poor. Judgment: Poor. Assessment  DSM 5 DIAGNOSIS:   Substance-induced mood disorder  Alcohol use disorder, severe, currently in DTs, complicated by previous history of withdrawal seizures  Methamphetamine use disorder, mild    Medical conditions pertinent to the patient's mental health issues:  History of CVA  Fahr's syndrome   History of hepatitis C virus      Recommendations  1. Currently patient is not suicidal or homicidal.  Patient does not meet criteria for psychiatric hospitalization. 2.  Patient experiences withdrawal symptoms from alcohol. Patient's current medical condition requires to continue medical detox treatment for alcohol withdrawal symptoms. 3.  Please, continue one-to-one sitter, due to patient's delirium. 4.  Patient will have health benefit from admission to rehabilitation treatment for substance use disorder after medical treatment is done. 5.  Patient reported possible admission to [facility] for rehab treatment.     SW, please, explore possibilities of patient's placement to [facility] after discharge from the [facility].  6.  Psychiatry will sign off today.     Bernardo Asif MD

## 2020-05-20 NOTE — CARE COORDINATION
KATHY visited with Pt at bedside, 1:1 sitter present; he is agreeable to call intake number at [facility] [phone]; SW assisted with dialing his cell phone and he is currently on the call; intake asked for clinicals to be faxed to: [phone]    She indicated they have one male bed available.     Electronically signed by JAYSON Marie on 5/20/2020 at 1:50 PM

## 2020-05-21 VITALS
SYSTOLIC BLOOD PRESSURE: 124 MMHG | BODY MASS INDEX: 28.31 KG/M2 | OXYGEN SATURATION: 98 % | DIASTOLIC BLOOD PRESSURE: 88 MMHG | RESPIRATION RATE: 16 BRPM | TEMPERATURE: 97 F | HEART RATE: 90 BPM | HEIGHT: 63 IN | WEIGHT: 159.8 LBS

## 2020-05-21 PROCEDURE — 2580000003 HC RX 258: Performed by: PHYSICIAN ASSISTANT

## 2020-05-21 PROCEDURE — 6370000000 HC RX 637 (ALT 250 FOR IP): Performed by: PHYSICIAN ASSISTANT

## 2020-05-21 PROCEDURE — G0378 HOSPITAL OBSERVATION PER HR: HCPCS

## 2020-05-21 PROCEDURE — 6360000002 HC RX W HCPCS: Performed by: PHYSICIAN ASSISTANT

## 2020-05-21 PROCEDURE — 6370000000 HC RX 637 (ALT 250 FOR IP): Performed by: HOSPITALIST

## 2020-05-21 PROCEDURE — 96372 THER/PROPH/DIAG INJ SC/IM: CPT

## 2020-05-21 RX ORDER — FOLIC ACID 1 MG/1
1 TABLET ORAL DAILY
Qty: 30 TABLET | Refills: 3 | Status: SHIPPED | OUTPATIENT
Start: 2020-05-22

## 2020-05-21 RX ORDER — LANOLIN ALCOHOL/MO/W.PET/CERES
100 CREAM (GRAM) TOPICAL DAILY
Qty: 30 TABLET | Refills: 3 | Status: SHIPPED | OUTPATIENT
Start: 2020-05-22

## 2020-05-21 RX ADMIN — Medication 100 MG: at 08:13

## 2020-05-21 RX ADMIN — FOLIC ACID 1 MG: 1 TABLET ORAL at 08:13

## 2020-05-21 RX ADMIN — PANTOPRAZOLE SODIUM 40 MG: 40 TABLET, DELAYED RELEASE ORAL at 05:31

## 2020-05-21 RX ADMIN — Medication 10 ML: at 08:13

## 2020-05-21 RX ADMIN — ENOXAPARIN SODIUM 40 MG: 40 INJECTION SUBCUTANEOUS at 08:13

## 2020-05-21 NOTE — PLAN OF CARE
Problem: Infection:  Goal: Will remain free from infection  Description: Will remain free from infection  5/21/2020 0320 by Andrew Whitt RN  Outcome: Ongoing  5/20/2020 1359 by Earnestine Andrews RN  Outcome: Ongoing     Problem: Safety:  Goal: Free from accidental physical injury  Description: Free from accidental physical injury  5/21/2020 0320 by Andrew Whitt RN  Outcome: Ongoing  5/20/2020 1359 by Earnestine Andrews RN  Outcome: Ongoing  Goal: Free from intentional harm  Description: Free from intentional harm  5/21/2020 0320 by Andrew Whitt RN  Outcome: Ongoing  5/20/2020 1359 by Earnestine Andrews RN  Outcome: Ongoing  Goal: Ability to remain free from injury will improve  Description: Ability to remain free from injury will improve  5/21/2020 0320 by Andrew Whitt RN  Outcome: Ongoing  5/20/2020 1359 by Earnestine Andrews RN  Outcome: Ongoing     Problem: Daily Care:  Goal: Daily care needs are met  Description: Daily care needs are met  5/21/2020 0320 by Andrew Whitt RN  Outcome: Ongoing  5/20/2020 1359 by Earnestine Andrews RN  Outcome: Ongoing     Problem: Pain:  Goal: Patient's pain/discomfort is manageable  Description: Patient's pain/discomfort is manageable  5/21/2020 0320 by Andrew Whitt RN  Outcome: Ongoing  5/20/2020 1359 by Earnestine Andrews RN  Outcome: Ongoing     Problem: Skin Integrity:  Goal: Skin integrity will stabilize  Description: Skin integrity will stabilize  5/21/2020 0320 by Andrew Whitt RN  Outcome: Ongoing  5/20/2020 1359 by Earnestine Andrews RN  Outcome: Ongoing     Problem: Discharge Planning:  Goal: Patients continuum of care needs are met  Description: Patients continuum of care needs are met  5/21/2020 0320 by Andrew Whitt RN  Outcome: Ongoing  5/20/2020 1359 by Earnestine Andrews RN  Outcome: Ongoing     Problem: Falls - Risk of:  Goal: Will remain free from falls  Description: Will remain free from falls  5/21/2020 0320 by Andrew Whitt RN  Outcome: Ongoing  5/20/2020 1359 by Earnestine Andrews RN  Outcome: Ongoing  Goal:

## 2020-05-21 NOTE — CARE COORDINATION
SW placed call to [facility] this AM re: status on Pt being admitted to their care; rep indicated that they received the updates, and will admit when Pt is not experiencing delusions/hallucinations; SW advised to look at the bottom/plan portion of PA's note re: delusions-resolved; she noted she will call and advise clinical staff and call SW back.     Electronically signed by JAYSON Floyd on 5/21/2020 at 9:21 AM

## 2020-05-21 NOTE — DISCHARGE INSTR - COC
OWWJ:925283137}  Dressing  {CHP DME DVKO:526749245}  Toileting  {P DME SYIO:468073580}  Feeding  {CHP DME WGNY:794015317}  Med Admin  {CHP DME UCXR:688290183}  Med Delivery   { YI MED Delivery:527541760}    Wound Care Documentation and Therapy:        Elimination:  Continence:   · Bowel: {YES / WV:56542}  · Bladder: {YES / ZP:18669}  Urinary Catheter: {Urinary Catheter:309677457}   Colostomy/Ileostomy/Ileal Conduit: {YES / BM:94171}       Date of Last BM: ***    Intake/Output Summary (Last 24 hours) at 2020 1019  Last data filed at 2020 0022  Gross per 24 hour   Intake 480 ml   Output --   Net 480 ml     I/O last 3 completed shifts:   In: 18 [P.O.:480]  Out: 525 [Urine:525]    Safety Concerns:     [employer] Safety Concerns:335484211}    Impairments/Disabilities:      [address] [employer] Impairments/Disabilities:846637248}    Nutrition Therapy:  Current Nutrition Therapy:   [employer] Diet List:943447751}    Routes of Feeding: {[facility] DME Other Feedings:161267163}  Liquids: {Slp liquid thickness:81488}  Daily Fluid Restriction: {P DME Yes amt example:305006746}  Last Modified Barium Swallow with Video (Video Swallowing Test): {Done Not Done XVEO:202555251}    Treatments at the Time of Hospital Discharge:   Respiratory Treatments: ***  Oxygen Therapy:  {Therapy; copd oxygen:71106}  Ventilator:    {[facility] Vent PCJC:563049099}    Rehab Therapies: {THERAPEUTIC INTERVENTION:6782176104}  Weight Bearing Status/Restrictions: [employer]  Weight Bearin}  Other Medical Equipment (for information only, NOT a DME order):  {EQUIPMENT:137073874}  Other Treatments: ***    Patient's personal belongings (please select all that are sent with patient):  {[facility] DME Belongings:157939549}    RN SIGNATURE:  {Esignature:179443286}    CASE MANAGEMENT/SOCIAL WORK SECTION    Inpatient Status Date: ***    Readmission Risk Assessment Score:  Readmission Risk              Risk of Unplanned Readmission:        0           Discharging to Facility/ Agency   · Name:   · Address:  · Phone:  · Fax:    Dialysis Facility (if applicable)   · Name:  · Address:  · Dialysis Schedule:  · Phone:  · Fax:    / signature: {Esignature:604233938}    PHYSICIAN SECTION    Prognosis: {Prognosis:2106368616}    Condition at Discharge: Clifford8 Kristin Soto Patient Condition:071354051}    Rehab Potential (if transferring to Rehab): {Prognosis:1815620768}    Recommended Labs or Other Treatments After Discharge: ***    Physician Certification: I certify the above information and transfer of Mono Nichole  is necessary for the continuing treatment of the diagnosis listed and that he requires {Admit to Appropriate Level of Care:29316} for {GREATER/LESS:174350119} 30 days.      Update Admission H&P: {CHP DME Changes in [facility]:500318374}    PHYSICIAN SIGNATURE:  {Esignature:443531791}

## 2020-06-02 ASSESSMENT — ENCOUNTER SYMPTOMS
ABDOMINAL PAIN: 0
SHORTNESS OF BREATH: 0

## 2020-07-09 ENCOUNTER — HOSPITAL ENCOUNTER (EMERGENCY)
Facility: HOSPITAL | Age: 55
Discharge: HOME | End: 2020-07-09
Admitting: EMERGENCY MEDICINE

## 2020-07-09 VITALS
RESPIRATION RATE: 16 BRPM | BODY MASS INDEX: 31.01 KG/M2 | HEIGHT: 63 IN | HEART RATE: 85 BPM | DIASTOLIC BLOOD PRESSURE: 70 MMHG | TEMPERATURE: 98.2 F | SYSTOLIC BLOOD PRESSURE: 125 MMHG | OXYGEN SATURATION: 99 % | WEIGHT: 175 LBS

## 2020-07-09 DIAGNOSIS — E87.6 HYPOKALEMIA: ICD-10-CM

## 2020-07-09 DIAGNOSIS — T50.902A PURPOSEFUL NON-SUICIDAL DRUG INGESTION, INITIAL ENCOUNTER (HCC): Primary | ICD-10-CM

## 2020-07-09 DIAGNOSIS — R79.89 ELEVATED TSH: ICD-10-CM

## 2020-07-09 LAB
ALBUMIN SERPL-MCNC: 4.4 G/DL (ref 3.5–5.2)
ALBUMIN/GLOB SERPL: 1.4 G/DL
ALP SERPL-CCNC: 59 U/L (ref 39–117)
ALT SERPL W P-5'-P-CCNC: 50 U/L (ref 1–41)
AMPHET+METHAMPHET UR QL: POSITIVE
AMPHETAMINES UR QL: POSITIVE
ANION GAP SERPL CALCULATED.3IONS-SCNC: 17 MMOL/L (ref 5–15)
AST SERPL-CCNC: 43 U/L (ref 1–40)
BARBITURATES UR QL SCN: NEGATIVE
BASOPHILS # BLD AUTO: 0.05 10*3/MM3 (ref 0–0.2)
BASOPHILS NFR BLD AUTO: 0.6 % (ref 0–1.5)
BENZODIAZ UR QL SCN: NEGATIVE
BILIRUB SERPL-MCNC: 0.4 MG/DL (ref 0–1.2)
BUN SERPL-MCNC: 8 MG/DL (ref 6–20)
BUN/CREAT SERPL: 9.4 (ref 7–25)
BUPRENORPHINE SERPL-MCNC: NEGATIVE NG/ML
CALCIUM SPEC-SCNC: 8.9 MG/DL (ref 8.6–10.5)
CANNABINOIDS SERPL QL: NEGATIVE
CHLORIDE SERPL-SCNC: 98 MMOL/L (ref 98–107)
CO2 SERPL-SCNC: 24 MMOL/L (ref 22–29)
COCAINE UR QL: NEGATIVE
CREAT SERPL-MCNC: 0.85 MG/DL (ref 0.76–1.27)
DEPRECATED RDW RBC AUTO: 45.3 FL (ref 37–54)
EOSINOPHIL # BLD AUTO: 0.03 10*3/MM3 (ref 0–0.4)
EOSINOPHIL NFR BLD AUTO: 0.3 % (ref 0.3–6.2)
ERYTHROCYTE [DISTWIDTH] IN BLOOD BY AUTOMATED COUNT: 13.5 % (ref 12.3–15.4)
ETHANOL UR QL: <0.01 %
GFR SERPL CREATININE-BSD FRML MDRD: 94 ML/MIN/1.73
GLOBULIN UR ELPH-MCNC: 3.1 GM/DL
GLUCOSE SERPL-MCNC: 169 MG/DL (ref 65–99)
HCT VFR BLD AUTO: 42.6 % (ref 37.5–51)
HGB BLD-MCNC: 14.5 G/DL (ref 13–17.7)
IMM GRANULOCYTES # BLD AUTO: 0.04 10*3/MM3 (ref 0–0.05)
IMM GRANULOCYTES NFR BLD AUTO: 0.5 % (ref 0–0.5)
LYMPHOCYTES # BLD AUTO: 1.72 10*3/MM3 (ref 0.7–3.1)
LYMPHOCYTES NFR BLD AUTO: 19.4 % (ref 19.6–45.3)
MCH RBC QN AUTO: 31 PG (ref 26.6–33)
MCHC RBC AUTO-ENTMCNC: 34 G/DL (ref 31.5–35.7)
MCV RBC AUTO: 91.2 FL (ref 79–97)
METHADONE UR QL SCN: NEGATIVE
MONOCYTES # BLD AUTO: 0.79 10*3/MM3 (ref 0.1–0.9)
MONOCYTES NFR BLD AUTO: 8.9 % (ref 5–12)
NEUTROPHILS NFR BLD AUTO: 6.22 10*3/MM3 (ref 1.7–7)
NEUTROPHILS NFR BLD AUTO: 70.3 % (ref 42.7–76)
NRBC BLD AUTO-RTO: 0 /100 WBC (ref 0–0.2)
OPIATES UR QL: NEGATIVE
OXYCODONE UR QL SCN: NEGATIVE
PCP UR QL SCN: NEGATIVE
PLATELET # BLD AUTO: 229 10*3/MM3 (ref 140–450)
PMV BLD AUTO: 10.8 FL (ref 6–12)
POTASSIUM SERPL-SCNC: 2.8 MMOL/L (ref 3.5–5.2)
PROPOXYPH UR QL: NEGATIVE
PROT SERPL-MCNC: 7.5 G/DL (ref 6–8.5)
RBC # BLD AUTO: 4.67 10*6/MM3 (ref 4.14–5.8)
SODIUM SERPL-SCNC: 139 MMOL/L (ref 136–145)
T4 FREE SERPL-MCNC: 1.06 NG/DL (ref 0.93–1.7)
TRICYCLICS UR QL SCN: NEGATIVE
TSH SERPL DL<=0.05 MIU/L-ACNC: 7.68 UIU/ML (ref 0.27–4.2)
WBC # BLD AUTO: 8.85 10*3/MM3 (ref 3.4–10.8)

## 2020-07-09 PROCEDURE — 80307 DRUG TEST PRSMV CHEM ANLYZR: CPT | Performed by: PHYSICIAN ASSISTANT

## 2020-07-09 PROCEDURE — 99284 EMERGENCY DEPT VISIT MOD MDM: CPT

## 2020-07-09 PROCEDURE — 84439 ASSAY OF FREE THYROXINE: CPT | Performed by: PHYSICIAN ASSISTANT

## 2020-07-09 PROCEDURE — 93005 ELECTROCARDIOGRAM TRACING: CPT | Performed by: PHYSICIAN ASSISTANT

## 2020-07-09 PROCEDURE — 96374 THER/PROPH/DIAG INJ IV PUSH: CPT

## 2020-07-09 PROCEDURE — 93010 ELECTROCARDIOGRAM REPORT: CPT | Performed by: INTERNAL MEDICINE

## 2020-07-09 PROCEDURE — 25010000002 ONDANSETRON PER 1 MG: Performed by: PHYSICIAN ASSISTANT

## 2020-07-09 PROCEDURE — 80050 GENERAL HEALTH PANEL: CPT | Performed by: PHYSICIAN ASSISTANT

## 2020-07-09 RX ORDER — ONDANSETRON 2 MG/ML
4 INJECTION INTRAMUSCULAR; INTRAVENOUS ONCE
Status: COMPLETED | OUTPATIENT
Start: 2020-07-09 | End: 2020-07-09

## 2020-07-09 RX ORDER — SODIUM CHLORIDE 0.9 % (FLUSH) 0.9 %
10 SYRINGE (ML) INJECTION AS NEEDED
Status: DISCONTINUED | OUTPATIENT
Start: 2020-07-09 | End: 2020-07-09 | Stop reason: HOSPADM

## 2020-07-09 RX ORDER — POTASSIUM CHLORIDE 750 MG/1
40 CAPSULE, EXTENDED RELEASE ORAL ONCE
Status: COMPLETED | OUTPATIENT
Start: 2020-07-09 | End: 2020-07-09

## 2020-07-09 RX ADMIN — SODIUM CHLORIDE 1000 ML: 9 INJECTION, SOLUTION INTRAVENOUS at 11:55

## 2020-07-09 RX ADMIN — POTASSIUM CHLORIDE 40 MEQ: 750 CAPSULE, EXTENDED RELEASE ORAL at 12:47

## 2020-07-09 RX ADMIN — ONDANSETRON HYDROCHLORIDE 4 MG: 2 SOLUTION INTRAMUSCULAR; INTRAVENOUS at 11:55

## 2020-07-09 NOTE — ED PROVIDER NOTES
"Subjective   History of Present Illness    Patient is a 55-year-old male presenting to ED with confusion.  Patient reported that this morning he woke up at his baseline but was feeling slightly anxious.  Patient reported he was in the parking lot at work of [employer] and when he noticed a bag of pills on the ground which she thought were Valium.  Patient reported he has used Valium before for anxiety so he took 1.  Patient stated this was at 730 and by 8 AM he was starting to feel very confused and altered.  Patient reported that this has progressed and now he feels fatigued all over and cannot keep focused due to the confusion.  Patient reported his supervisor at work dropped him off at the ED for evaluation as he has a history of strokes.  Patient denies any illnesses or injuries prior to taking the pills this morning including recent head injuries in the past 6 weeks.  Patient reported his anxiety is at baseline.  Patient denies any recent known sick contact.  Patient denies any recent illnesses such as fevers, diaphoresis, chills, URI symptoms, cough/cold symptoms, neck pain, or any /GI symptoms.  Patient denies any nausea or vomiting since taking the medication.  Patient denies any focal/localized neurological deficits but reports he just feels weakness all over.  Patient denies any headaches, dizziness, lightheadedness, or syncope.  Patient denies any other medication use, alcohol use, or drug use prior to arrival.  Patient did note that he has a history of pseudohypoparathyroidism and he is supposed to be taking a thyroid medication however he has run out.    Patient reported that he is a 1 pack/day cigarette smoker as well as \"an alcoholic.\"  Patient stated that he has not drank in the past few days.  Patient denies use of marijuana or any other IV/recreational/illicit drugs.  Patient reports known medication allergies to Bactrim and Keflex.  Patient has a medical history significant for GERD, CAD, COPD, " Fahr's disease, degenerative joint disease, hepatitis C, infectious viral hepatitis, cirrhosis, as well as pseudohypoparathyroidism.  Patient has a surgical history positive for hand surgery as well as right foot fracture repair.    Records reviewed show patient was seen at [facility] ED on 5/18/20 and admitted for a suicide attempt by drug ingestion as well as acute encephalopathy.  At that time patient swallowed a gram of meth.    Review of Systems   Constitutional: Positive for fatigue.   HENT: Negative.    Eyes: Negative.  Negative for visual disturbance.   Respiratory: Negative.  Negative for shortness of breath.    Cardiovascular: Negative.  Negative for chest pain.   Gastrointestinal: Negative.  Negative for abdominal pain and vomiting.   Genitourinary: Negative.    Musculoskeletal: Negative.    Skin: Negative.  Negative for wound.   Neurological: Positive for weakness (Generalized). Negative for light-headedness.        Denies head injury  Denies focal neurological deficits   Psychiatric/Behavioral: Positive for confusion and decreased concentration. Negative for self-injury and suicidal ideas.        Denies homicidal ideation   All other systems reviewed and are negative.      Past Medical History:   Diagnosis Date   • CAD (coronary artery disease)    • COPD (chronic obstructive pulmonary disease) (CMS/HCC)    • Degenerative joint disease    • Fahr's disease (CMS/HCC)    • GERD (gastroesophageal reflux disease)    • Hemoptysis    • Hepatitis C    • Hypocalcemia    • Infectious viral hepatitis     Hepatitis C   • Pseudohypoparathyroidism    • Psoriasis        Allergies   Allergen Reactions   • Bactrim [Sulfamethoxazole-Trimethoprim]    • Cephalexin      Nausea/Diarrhea       Past Surgical History:   Procedure Laterality Date   • FOOT SURGERY     • FRACTURE SURGERY Right     foot   • HAND SURGERY         Family History   Problem Relation Age of Onset   • Alcohol abuse Mother    • Cirrhosis Father    • Hepatitis  Father    • Alcohol abuse Father    • Alcohol abuse Other    • Colon cancer Neg Hx    • Colon polyps Neg Hx        Social History     Socioeconomic History   • Marital status: Single     Spouse name: Not on file   • Number of children: Not on file   • Years of education: Not on file   • Highest education level: Not on file   Tobacco Use   • Smoking status: Current Every Day Smoker     Packs/day: 1.00     Years: 30.00     Pack years: 30.00     Types: Cigarettes   • Smokeless tobacco: Former User   Substance and Sexual Activity   • Alcohol use: Yes     Comment: daily   • Drug use: No   • Sexual activity: Defer           Objective   Physical Exam   Constitutional: He is oriented to person, place, and time. He appears well-developed and well-nourished. He appears lethargic. He is cooperative.  Non-toxic appearance.   Eyes: Conjunctivae and EOM are normal.   Pupils constricted bilaterally and symmetrically with slight reaction to light, round, and equal   Neck: Normal range of motion. Neck supple.   Cardiovascular: Regular rhythm, normal heart sounds, intact distal pulses and normal pulses. Tachycardia present.   Pulmonary/Chest: Effort normal and breath sounds normal. No respiratory distress. He has no wheezes. He exhibits no tenderness (No reproducible tenderness to palpitation of chest wall).   Abdominal: Soft. Bowel sounds are normal. There is no tenderness.   Musculoskeletal: Normal range of motion. He exhibits no edema.   Neurological: He is oriented to person, place, and time. He appears lethargic. GCS eye subscore is 4. GCS verbal subscore is 5. GCS motor subscore is 6.   Skin: Skin is warm. Capillary refill takes less than 2 seconds. No rash noted. He is diaphoretic. No pallor.   Psychiatric: His behavior is normal. Judgment normal. His affect is blunt (flat affect). His speech is delayed. He expresses no homicidal and no suicidal ideation.   Nursing note and vitals reviewed.      Procedures           ED  Course  ED Course as of Jul 09 1309   Thu Jul 09, 2020   1132 Pill identification shows that the tablets are 30 mg oxycodone's.    [JS]   1145 At this time the bag of 10 pills was handed over to security for appropriate and safe disposal.  Patient was made aware and requested disposal.    [JS]   1233 CBC with no acute findings.CMP with hyperglycemia 169, hypokalemia 2.8, elevated ALT 50, elevated AST 43 with normal alk phos and normal total bilirubin.  Anion gap of 17.Alcohol level negative.Thyroid hormones pending.Urine drug screen pending collection.    [JS]   1236 TSH elevated at 7.68.  Free T4 WNL.    [JS]   1253 UDS positive for methamphetamines and amphetamines.    [JS]   1254 Upon reevaluation at this time patient is awake, alert, and oriented x3 in his room reporting he feels significantly better.  Discussed with patient lab and urinalysis findings.  Discussed with patient importance of close PCP follow-up and adhering to his medication regimen for his Fahr's disease as well as his pseudo-hypoparathyroidism.  Patient reported is not uncommon for his potassium to go low with his Fahr's disease.  Patient was able to tolerate p.o. replacement.  Discussed with patient importance of PCP follow-up within the next 24 to 48 hours for reevaluation to assure he is continuing to improve if his symptoms have not resolved.  Discussed with patient urine drug screen and he admitted that he did smoke meth last night.  Patient very adamantly denies IV injection of any substances.  Examination continues to find no injection sites or no evidence of abscess/cellulitis.  Advised patient importance of cessation from drug use in helping his alcohol cessation as well.  Patient adamantly denies use of any other drugs.  Discussed with patient is important not to  medications off the street and he agrees.  Discussed with patient very strict return precautions and need for immediate return to ED should he develop any new or  worsening symptoms.  Patient appreciative for help her today with no further questions, concerns, or needs at this time and is now stable for discharge.    [JS]   1303 Discussed case with Dr. Bal Pires at this time who is in agreement with continued outpatient follow-up for patient's management of abnormal thyroid levels as well as continued monitoring of potassium.  Dr. Pires with no further recommendations at this time.    [JS]      ED Course User Index  [JS] Jude Joe PA-C                                           MDM  Number of Diagnoses or Management Options  Elevated TSH:   Hypokalemia:   Purposeful non-suicidal drug ingestion, initial encounter (CMS/[facility]):      Amount and/or Complexity of Data Reviewed  Clinical lab tests: ordered and reviewed  Tests in the medicine section of CPT®: ordered and reviewed  Decide to obtain previous medical records or to obtain history from someone other than the patient: yes  Review and summarize past medical records: yes  Discuss the patient with other providers: yes (Dr. Pires (attending))    Patient Progress  Patient progress: improved      Final diagnoses:   Hypokalemia   Purposeful non-suicidal drug ingestion, initial encounter (CMS/[facility])   Elevated TSH            Jude Joe PA-C  07/09/20 0780

## 2020-07-09 NOTE — DISCHARGE INSTRUCTIONS
It is very important that you continue to follow-up with your family doctor for management of your Fahr's disease and to assure that your potassium, thyroid levels, as well as calcium are all appropriate.  Please increase your hydration over the next few days.  It is important that you not only stop using alcohol but as well stop using any other substances such as methamphetamines or any drugs you may obtain off the streets.  Should you need additional help and resources please see the contact information below for [facility] as well as [address] [place] [address] behavioral health.

## 2021-07-26 ENCOUNTER — OFFICE VISIT (OUTPATIENT)
Dept: GASTROENTEROLOGY | Facility: CLINIC | Age: 56
End: 2021-07-26

## 2021-07-26 VITALS
WEIGHT: 170 LBS | BODY MASS INDEX: 30.12 KG/M2 | HEIGHT: 63 IN | SYSTOLIC BLOOD PRESSURE: 104 MMHG | HEART RATE: 63 BPM | DIASTOLIC BLOOD PRESSURE: 63 MMHG

## 2021-07-26 DIAGNOSIS — B18.2 CHRONIC HEPATITIS C WITHOUT HEPATIC COMA (HCC): Primary | ICD-10-CM

## 2021-07-26 DIAGNOSIS — K21.9 GASTROESOPHAGEAL REFLUX DISEASE, UNSPECIFIED WHETHER ESOPHAGITIS PRESENT: ICD-10-CM

## 2021-07-26 DIAGNOSIS — Z12.11 COLON CANCER SCREENING: ICD-10-CM

## 2021-07-26 PROCEDURE — 99214 OFFICE O/P EST MOD 30 MIN: CPT | Performed by: INTERNAL MEDICINE

## 2021-07-26 RX ORDER — DEXTROSE AND SODIUM CHLORIDE 5; .45 G/100ML; G/100ML
30 INJECTION, SOLUTION INTRAVENOUS CONTINUOUS PRN
Status: CANCELLED | OUTPATIENT
Start: 2021-08-09

## 2021-07-26 NOTE — PATIENT INSTRUCTIONS
"BMI for Adults  What is BMI?  Body mass index (BMI) is a number that is calculated from a person's weight and height. BMI can help estimate how much of a person's weight is composed of fat. BMI does not measure body fat directly. Rather, it is an alternative to procedures that directly measure body fat, which can be difficult and expensive.  BMI can help identify people who may be at higher risk for certain medical problems.  What are BMI measurements used for?  BMI is used as a screening tool to identify possible weight problems. It helps determine whether a person is obese, overweight, a healthy weight, or underweight.  BMI is useful for:  · Identifying a weight problem that may be related to a medical condition or may increase the risk for medical problems.  · Promoting changes, such as changes in diet and exercise, to help reach a healthy weight. BMI screening can be repeated to see if these changes are working.  How is BMI calculated?  BMI involves measuring your weight in relation to your height. Both height and weight are measured, and the BMI is calculated from those numbers. This can be done either in English (U.S[place] or metric measurements. Note that charts and online BMI calculators are available to help you find your BMI quickly and easily without having to do these calculations yourself.  To calculate your BMI in English (U.S[place] measurements:    1. Measure your weight in pounds (lb).  2. Multiply the number of pounds by 703.  ? For example, for a person who weighs 180 lb, multiply that number by 703, which equals 126,540.  3. Measure your height in inches. Then multiply that number by itself to get a measurement called \"inches squared.\"  ? For example, for a person who is 70 inches tall, the \"inches squared\" measurement is 70 inches x 70 inches, which equals 4,900 inches squared.  4. Divide the total from step 2 (number of lb x 703) by the total from step 3 (inches squared): 126,540 ÷ 4,900 = 25.8. This is " "your BMI.  To calculate your BMI in metric measurements:  1. Measure your weight in kilograms (kg).  2. Measure your height in meters (m). Then multiply that number by itself to get a measurement called \"meters squared.\"  ? For example, for a person who is 1.75 m tall, the \"meters squared\" measurement is 1.75 m x 1.75 m, which is equal to 3.1 meters squared.  3. Divide the number of kilograms (your weight) by the meters squared number. In this example: 70 ÷ 3.1 = 22.6. This is your BMI.  What do the results mean?  BMI charts are used to identify whether you are underweight, normal weight, overweight, or obese. The following guidelines will be used:  · Underweight: BMI less than 18.5.  · Normal weight: BMI between 18.5 and 24.9.  · Overweight: BMI between 25 and 29.9.  · Obese: BMI of 30 or above.  Keep these notes in mind:  · Weight includes both fat and muscle, so someone with a muscular build, such as an athlete, may have a BMI that is higher than 24.9. In cases like these, BMI is not an accurate measure of body fat.  · To determine if excess body fat is the cause of a BMI of 25 or higher, further assessments may need to be done by a health care provider.  · BMI is usually interpreted in the same way for men and women.  Where to find more information  For more information about BMI, including tools to quickly calculate your BMI, go to these websites:  · Centers for Disease Control and Prevention: www.cdc.gov  · American Heart Association: www.heart.org  · National Heart, Lung, and Blood [place]: www.nhlbi.nih.gov  Summary  · Body mass index (BMI) is a number that is calculated from a person's weight and height.  · BMI may help estimate how much of a person's weight is composed of fat. BMI can help identify those who may be at higher risk for certain medical problems.  · BMI can be measured using English measurements or metric measurements.  · BMI charts are used to identify whether you are underweight, normal " weight, overweight, or obese.  This information is not intended to replace advice given to you by your health care provider. Make sure you discuss any questions you have with your health care provider.  Document Revised: 09/09/2020 Document Reviewed: 07/17/2020  Elsevier Patient Education © 2021 [employer]

## 2021-07-26 NOTE — PROGRESS NOTES
[facility]      Chief Complaint:   Chief Complaint   Patient presents with   • Hepatitis C       Subjective     HPI:   Mr. Taveras is a 56-year-old  male with past medical history coronary artery disease, COPD, degenerative joint disease, pots disease, GERD, hemoptysis, hypocalcemia,, pseudohypoparathyroidism, psoriasis, hepatitis C of several years duration presenting for evaluation.  He has GERD for past several years and currently taking Prilosec with good control of symptoms.  He does not have any GI complaints otherwise including abdominal pain, nausea, vomiting, diarrhea, constipation, rectal bleeding or weight loss.  He was diagnosed with hepatitis C several years ago and has not started treatment.  Has history of IV drug usage which he has been off for past several years.  Also has tattoos and denied history of blood transfusion.  Denied alcohol usage.  For colorectal cancer screening.  Most recent hepatitis C PCR is positive.    Past Medical History:   Past Medical History:   Diagnosis Date   • CAD (coronary artery disease)    • COPD (chronic obstructive pulmonary disease) (CMS/HCC)    • Degenerative joint disease    • Fahr's disease (CMS/HCC)    • GERD (gastroesophageal reflux disease)    • Hemoptysis    • Hepatitis C    • Hypocalcemia    • Infectious viral hepatitis     Hepatitis C   • Pseudohypoparathyroidism    • Psoriasis        Past Surgical History:  Past Surgical History:   Procedure Laterality Date   • FOOT SURGERY     • FRACTURE SURGERY Right     foot   • HAND SURGERY         Family History:  Family History   Problem Relation Age of Onset   • Alcohol abuse Mother    • Cirrhosis Father    • Hepatitis Father    • Alcohol abuse Father    • Alcohol abuse Other    • Colon cancer Neg Hx    • Colon polyps Neg Hx        Social History:   reports that he has quit smoking. His smoking use included cigarettes. He has a 30.00 pack-year smoking history. He has quit using  smokeless tobacco. He reports previous alcohol use. He reports that he does not use drugs.    Medications:   Prior to Admission medications    Medication Sig Start Date End Date Taking? Authorizing Provider   calcitriol (ROCALTROL) 0.25 MCG capsule Take 0.25 mcg by mouth three times daily    Yes Yu Rosales MD   omeprazole (priLOSEC) 20 MG capsule Take 20 mg by mouth daily   Yes Yu Rosales MD   calcium carbonate (TUMS) 500 MG chewable tablet Chew 1 tablet Daily.    Yu Rosales MD   clobetasol (TEMOVATE) 0.05 % ointment Apply  topically 2 (Two) Times a Day.    Yu Rosales MD   diclofenac (VOLTAREN) 50 MG EC tablet As Needed. 3/25/17   Yu Rosales MD   ibuprofen (ADVIL,MOTRIN) 800 MG tablet  3/25/17   Yu Rosales MD   meloxicam (MOBIC) 7.5 MG tablet Take 1 tablet by mouth Daily. 7/23/17   Jeremías Guerra PA   ondansetron (ZOFRAN) 4 MG tablet Take 4 mg by mouth Every 8 (Eight) Hours As Needed for Nausea or Vomiting.    Yu Rosales MD   OTEZLA 10 & 20 & 30 MG tablet therapy pack  3/6/19   Yu Rosales MD   oxyCODONE-acetaminophen (PERCOCET) 7.5-325 MG per tablet Take 1 tablet by mouth Every 4 (Four) Hours As Needed for Moderate Pain . 4/26/18   Ritu Garcia APRN   polyethylene glycol (GoLYTELY) 236 g solution Please use the instructions given 7/26/21   Valery Mccarty MD   VENTOLIN  (90 BASE) MCG/ACT inhaler  3/24/17   Yu Rosales MD       Allergies:  Bactrim [sulfamethoxazole-trimethoprim] and Cephalexin    ROS:    Review of Systems   Constitutional: Negative for chills, fatigue, fever and unexpected weight change.   HENT: Negative for congestion, ear discharge, hearing loss, nosebleeds and sore throat.    Eyes: Negative for pain, discharge and redness.   Respiratory: Negative for cough, chest tightness, shortness of breath and wheezing.    Cardiovascular: Negative for chest pain and palpitations.  "  Gastrointestinal: Negative for abdominal distention, abdominal pain, blood in stool, constipation, diarrhea, nausea and vomiting.   Endocrine: Negative for cold intolerance, polydipsia, polyphagia and polyuria.   Genitourinary: Negative for dysuria, flank pain, frequency, hematuria and urgency.   Musculoskeletal: Negative for arthralgias, back pain, joint swelling and myalgias.   Skin: Negative for color change, pallor and rash.   Neurological: Negative for tremors, seizures, syncope, weakness and headaches.   Hematological: Negative for adenopathy. Does not bruise/bleed easily.   Psychiatric/Behavioral: Negative for behavioral problems, confusion, dysphoric mood, hallucinations and suicidal ideas. The patient is not nervous/anxious.      Objective     Blood pressure 104/63, pulse 63, height 160 cm (63\"), weight 77.1 kg (170 lb).    Physical Exam  Constitutional:       Appearance: He is well-developed.   HENT:      Head: Normocephalic and atraumatic.   Eyes:      Conjunctiva/sclera: Conjunctivae normal.      Pupils: Pupils are equal, round, and reactive to light.   Neck:      Thyroid: No thyromegaly.   Cardiovascular:      Rate and Rhythm: Normal rate and regular rhythm.      Heart sounds: Normal heart sounds. No murmur heard.     Pulmonary:      Effort: Pulmonary effort is normal.      Breath sounds: Normal breath sounds. No wheezing.   Abdominal:      General: Bowel sounds are normal. There is no distension.      Palpations: Abdomen is soft. There is no mass.      Tenderness: There is no abdominal tenderness.      Hernia: No hernia is present.   Genitourinary:     Comments: No lesions noted  Musculoskeletal:         General: No tenderness. Normal range of motion.      Cervical back: Normal range of motion and neck supple.   Lymphadenopathy:      Cervical: No cervical adenopathy.   Skin:     General: Skin is warm and dry.      Findings: No rash.   Neurological:      Mental Status: He is alert and oriented to " person, place, and time.      Cranial Nerves: No cranial nerve deficit.   Psychiatric:         Thought Content: Thought content normal.        Extremities: No edema, cyanosis or clubbing.    Assessment/Plan   1.  Chronic hepatitis C, obtain FibroSure, genotype, LFTs and ultrasound of the liver.  Proceed with treatment with Mavyret for 8 weeks.  2.  Longstanding GERD, symptoms are well controlled with PPI.  Continue PPI and antireflux lifestyle.  Proceed with EGD for Winter's screening.  3.  Colorectal cancer screening, average risk.  Schedule colonoscopy.  4.  Obesity, recommend exercise and diet control.  5.  History of alcohol and IV drug usage, off currently.  Continue alcohol and IV drug abstinence.  Diagnoses and all orders for this visit:    1. Chronic hepatitis C without hepatic coma (CMS/HCC) (Primary)  -     HCV FibroSURE  -     HCV RNA Qual RFX Genotype  -     US Liver; Future  -     Comprehensive Metabolic Panel    2. Gastroesophageal reflux disease, unspecified whether esophagitis present  -     Case Request; Standing  -     dextrose 5 % and sodium chloride 0.45 % infusion  -     Case Request    3. Colon cancer screening  -     Case Request; Standing  -     dextrose 5 % and sodium chloride 0.45 % infusion  -     Case Request    Other orders  -     polyethylene glycol (GoLYTELY) 236 g solution; Please use the instructions given  Dispense: 4000 mL; Refill: 0  -     Follow Anesthesia Guidelines / Standing Orders; Future  -     Obtain Informed Consent; Future  -     Implement Anesthesia Orders Day of Procedure; Standing  -     Obtain Informed Consent; Standing  -     POC Glucose Once; Standing        ESOPHAGOGASTRODUODENOSCOPY (N/A), COLONOSCOPY (N/A)     Diagnosis Plan   1. Chronic hepatitis C without hepatic coma (CMS/HCC)  HCV FibroSURE    HCV RNA Qual RFX Genotype    US Liver    Comprehensive Metabolic Panel   2. Gastroesophageal reflux disease, unspecified whether esophagitis present  Case Request     dextrose 5 % and sodium chloride 0.45 % infusion    Case Request   3. Colon cancer screening  Case Request    dextrose 5 % and sodium chloride 0.45 % infusion    Case Request       Anticipated Surgical Procedure:  Orders Placed This Encounter   Procedures   • US Liver     Standing Status:   Future     Standing Expiration Date:   7/26/2022     Order Specific Question:   Reason for Exam:     Answer:   hepatitis C     Order Specific Question:   Release to patient     Answer:   Immediate   • HCV FibroSURE     Order Specific Question:   Release to patient     Answer:   Immediate   • HCV RNA Qual RFX Genotype     Order Specific Question:   Release to patient     Answer:   Immediate   • Comprehensive Metabolic Panel     Order Specific Question:   Release to patient     Answer:   Immediate   • Follow Anesthesia Guidelines / Standing Orders     Standing Status:   Future   • Obtain Informed Consent     Standing Status:   Future     Order Specific Question:   Informed Consent Given For     Answer:   egd and colonoscopy       The risks, benefits, and alternatives of this procedure have been discussed with the patient or the responsible party- the patient understands and agrees to proceed.            This document has been electronically signed by Valery Mccarty MD on July 26, 2021 14:05 CDT

## 2021-07-30 ENCOUNTER — LAB (OUTPATIENT)
Dept: LAB | Facility: HOSPITAL | Age: 56
End: 2021-07-30

## 2021-07-30 ENCOUNTER — HOSPITAL ENCOUNTER (OUTPATIENT)
Dept: ULTRASOUND IMAGING | Facility: HOSPITAL | Age: 56
Discharge: HOME | End: 2021-07-30

## 2021-07-30 DIAGNOSIS — B18.2 CHRONIC HEPATITIS C WITHOUT HEPATIC COMA (HCC): Primary | ICD-10-CM

## 2021-07-30 DIAGNOSIS — B18.2 CHRONIC HEPATITIS C WITHOUT HEPATIC COMA (HCC): ICD-10-CM

## 2021-07-30 LAB
ALBUMIN SERPL-MCNC: 4.2 G/DL (ref 3.5–5.2)
ALBUMIN/GLOB SERPL: 1.7 G/DL
ALP SERPL-CCNC: 53 U/L (ref 39–117)
ALT SERPL W P-5'-P-CCNC: 32 U/L (ref 1–41)
ANION GAP SERPL CALCULATED.3IONS-SCNC: 7 MMOL/L (ref 5–15)
AST SERPL-CCNC: 25 U/L (ref 1–40)
BILIRUB SERPL-MCNC: 0.5 MG/DL (ref 0–1.2)
BUN SERPL-MCNC: 15 MG/DL (ref 6–20)
BUN/CREAT SERPL: 17.6 (ref 7–25)
CALCIUM SPEC-SCNC: 9.1 MG/DL (ref 8.6–10.5)
CHLORIDE SERPL-SCNC: 99 MMOL/L (ref 98–107)
CO2 SERPL-SCNC: 25 MMOL/L (ref 22–29)
CREAT SERPL-MCNC: 0.85 MG/DL (ref 0.76–1.27)
GFR SERPL CREATININE-BSD FRML MDRD: 94 ML/MIN/1.73
GLOBULIN UR ELPH-MCNC: 2.5 GM/DL
GLUCOSE SERPL-MCNC: 89 MG/DL (ref 65–99)
POTASSIUM SERPL-SCNC: 4.1 MMOL/L (ref 3.5–5.2)
PROT SERPL-MCNC: 6.7 G/DL (ref 6–8.5)
SODIUM SERPL-SCNC: 131 MMOL/L (ref 136–145)

## 2021-07-30 PROCEDURE — 87900 PHENOTYPE INFECT AGENT DRUG: CPT | Performed by: INTERNAL MEDICINE

## 2021-07-30 PROCEDURE — 87521 HEPATITIS C PROBE&RVRS TRNSC: CPT | Performed by: INTERNAL MEDICINE

## 2021-07-30 PROCEDURE — 81596 NFCT DS CHRNC HCV 6 ASSAYS: CPT | Performed by: INTERNAL MEDICINE

## 2021-07-30 PROCEDURE — 76705 ECHO EXAM OF ABDOMEN: CPT

## 2021-07-30 PROCEDURE — 36415 COLL VENOUS BLD VENIPUNCTURE: CPT | Performed by: INTERNAL MEDICINE

## 2021-07-30 PROCEDURE — 80053 COMPREHEN METABOLIC PANEL: CPT

## 2021-08-02 LAB
A2 MACROGLOB SERPL-MCNC: 251 MG/DL (ref 110–276)
ALT SERPL W P-5'-P-CCNC: 39 IU/L (ref 0–55)
APO A-I SERPL-MCNC: 154 MG/DL (ref 101–178)
BILIRUB SERPL-MCNC: 0.4 MG/DL (ref 0–1.2)
FIBROSIS SCORING:: ABNORMAL
FIBROSIS STAGE SERPL QL: ABNORMAL
GGT SERPL-CCNC: 20 IU/L (ref 0–65)
HAPTOGLOB SERPL-MCNC: 122 MG/DL (ref 29–370)
HCV AB SER QL: ABNORMAL
LABORATORY COMMENT REPORT: ABNORMAL
LIVER FIBR SCORE SERPL CALC.FIBROSURE: 0.25 (ref 0–0.21)
NECROINFLAMM ACTIVITY SCORING:: ABNORMAL
NECROINFLAMMATORY ACT GRADE SERPL QL: ABNORMAL
NECROINFLAMMATORY ACT SCORE SERPL: 0.2 (ref 0–0.17)
SERVICE CMNT-IMP: ABNORMAL

## 2021-08-04 LAB
HCV GENTYP SERPL NAA+PROBE: ABNORMAL
HCV RNA SERPL QL NAA+PROBE: POSITIVE
Lab: ABNORMAL

## 2021-08-06 ENCOUNTER — LAB (OUTPATIENT)
Dept: LAB | Facility: HOSPITAL | Age: 56
End: 2021-08-06

## 2021-08-06 DIAGNOSIS — Z01.818 PREOP TESTING: Primary | ICD-10-CM

## 2021-08-06 LAB — SARS-COV-2 N GENE RESP QL NAA+PROBE: NOT DETECTED

## 2021-08-06 PROCEDURE — 87635 SARS-COV-2 COVID-19 AMP PRB: CPT

## 2021-08-06 PROCEDURE — C9803 HOPD COVID-19 SPEC COLLECT: HCPCS

## 2021-08-09 ENCOUNTER — HOSPITAL ENCOUNTER (OUTPATIENT)
Facility: HOSPITAL | Age: 56
Setting detail: HOSPITAL OUTPATIENT SURGERY
Discharge: HOME | End: 2021-08-09
Attending: INTERNAL MEDICINE | Admitting: INTERNAL MEDICINE

## 2021-08-09 ENCOUNTER — ANESTHESIA EVENT (OUTPATIENT)
Dept: GASTROENTEROLOGY | Facility: HOSPITAL | Age: 56
End: 2021-08-09

## 2021-08-09 ENCOUNTER — ANESTHESIA (OUTPATIENT)
Dept: GASTROENTEROLOGY | Facility: HOSPITAL | Age: 56
End: 2021-08-09

## 2021-08-09 VITALS
TEMPERATURE: 97.9 F | BODY MASS INDEX: 28.38 KG/M2 | RESPIRATION RATE: 16 BRPM | HEART RATE: 77 BPM | HEIGHT: 64 IN | DIASTOLIC BLOOD PRESSURE: 60 MMHG | WEIGHT: 166.2 LBS | OXYGEN SATURATION: 99 % | SYSTOLIC BLOOD PRESSURE: 98 MMHG

## 2021-08-09 DIAGNOSIS — K21.9 GASTROESOPHAGEAL REFLUX DISEASE, UNSPECIFIED WHETHER ESOPHAGITIS PRESENT: ICD-10-CM

## 2021-08-09 DIAGNOSIS — Z12.11 COLON CANCER SCREENING: ICD-10-CM

## 2021-08-09 PROCEDURE — 25010000002 PROPOFOL 10 MG/ML EMULSION: Performed by: NURSE ANESTHETIST, CERTIFIED REGISTERED

## 2021-08-09 PROCEDURE — 43239 EGD BIOPSY SINGLE/MULTIPLE: CPT | Performed by: INTERNAL MEDICINE

## 2021-08-09 PROCEDURE — 45380 COLONOSCOPY AND BIOPSY: CPT | Performed by: INTERNAL MEDICINE

## 2021-08-09 RX ORDER — PROPOFOL 10 MG/ML
VIAL (ML) INTRAVENOUS AS NEEDED
Status: DISCONTINUED | OUTPATIENT
Start: 2021-08-09 | End: 2021-08-09 | Stop reason: SURG

## 2021-08-09 RX ORDER — ONDANSETRON 2 MG/ML
4 INJECTION INTRAMUSCULAR; INTRAVENOUS ONCE AS NEEDED
Status: DISCONTINUED | OUTPATIENT
Start: 2021-08-09 | End: 2021-08-09 | Stop reason: HOSPADM

## 2021-08-09 RX ORDER — LIDOCAINE HYDROCHLORIDE 20 MG/ML
INJECTION, SOLUTION INTRAVENOUS AS NEEDED
Status: DISCONTINUED | OUTPATIENT
Start: 2021-08-09 | End: 2021-08-09 | Stop reason: SURG

## 2021-08-09 RX ORDER — DEXTROSE AND SODIUM CHLORIDE 5; .45 G/100ML; G/100ML
30 INJECTION, SOLUTION INTRAVENOUS CONTINUOUS PRN
Status: DISCONTINUED | OUTPATIENT
Start: 2021-08-09 | End: 2021-08-09 | Stop reason: HOSPADM

## 2021-08-09 RX ADMIN — PROPOFOL 20 MG: 10 INJECTION, EMULSION INTRAVENOUS at 14:18

## 2021-08-09 RX ADMIN — PROPOFOL 30 MG: 10 INJECTION, EMULSION INTRAVENOUS at 14:16

## 2021-08-09 RX ADMIN — LIDOCAINE HYDROCHLORIDE 60 MG: 20 INJECTION, SOLUTION INTRAVENOUS at 14:12

## 2021-08-09 RX ADMIN — DEXTROSE AND SODIUM CHLORIDE 30 ML/HR: 5; 450 INJECTION, SOLUTION INTRAVENOUS at 14:08

## 2021-08-09 RX ADMIN — PROPOFOL 50 MG: 10 INJECTION, EMULSION INTRAVENOUS at 14:20

## 2021-08-09 RX ADMIN — PROPOFOL 20 MG: 10 INJECTION, EMULSION INTRAVENOUS at 14:19

## 2021-08-09 RX ADMIN — PROPOFOL 20 MG: 10 INJECTION, EMULSION INTRAVENOUS at 14:17

## 2021-08-09 RX ADMIN — PROPOFOL 20 MG: 10 INJECTION, EMULSION INTRAVENOUS at 14:25

## 2021-08-09 RX ADMIN — PROPOFOL 30 MG: 10 INJECTION, EMULSION INTRAVENOUS at 14:14

## 2021-08-09 RX ADMIN — PROPOFOL 80 MG: 10 INJECTION, EMULSION INTRAVENOUS at 14:12

## 2021-08-09 RX ADMIN — PROPOFOL 30 MG: 10 INJECTION, EMULSION INTRAVENOUS at 14:13

## 2021-08-09 RX ADMIN — PROPOFOL 50 MG: 10 INJECTION, EMULSION INTRAVENOUS at 14:23

## 2021-08-09 RX ADMIN — PROPOFOL 50 MG: 10 INJECTION, EMULSION INTRAVENOUS at 14:22

## 2021-08-09 RX ADMIN — PROPOFOL 30 MG: 10 INJECTION, EMULSION INTRAVENOUS at 14:15

## 2021-08-09 RX ADMIN — PROPOFOL 50 MG: 10 INJECTION, EMULSION INTRAVENOUS at 14:21

## 2021-08-09 NOTE — ANESTHESIA PREPROCEDURE EVALUATION
Anesthesia Evaluation     NPO Solid Status: > 8 hours  NPO Liquid Status: > 2 hours           Airway   Mallampati: II  TM distance: >3 FB  Dental      Pulmonary - normal exam    breath sounds clear to auscultation  (+) a smoker Former, COPD,   Cardiovascular - normal exam    ECG reviewed  Rhythm: regular  Rate: normal    (+) CAD,     ROS comment: Vent. Rate : 068 BPM     Atrial Rate : 068 BPM     P-R Int : 134 ms          QRS Dur : 092 ms      QT Int : 412 ms       P-R-T Axes : 034 012 014 degrees     QTc Int : 438 ms     Normal sinus rhythm with sinus arrhythmia  Normal ECG  When compared with ECG of 08-JAN-2017 12:03,  No significant change was found    Neuro/Psych    ROS Comment: Fahr's Disease  GI/Hepatic/Renal/Endo    (+) obesity,  GERD,  hepatitis C,     Musculoskeletal         ROS comment: Degenerative joint disease  Abdominal    Substance History      OB/GYN          Other   arthritis,                    Anesthesia Plan    ASA 3     MAC   total IV anesthesia  intravenous induction     Anesthetic plan, all risks, benefits, and alternatives have been provided, discussed and informed consent has been obtained with: patient.

## 2021-08-09 NOTE — ANESTHESIA POSTPROCEDURE EVALUATION
Patient: Jaime Taveras    Procedure Summary     Date: 08/09/21 Room / Location: [facility] ENDOSCOPY 1 / [facility] ENDOSCOPY    Anesthesia Start: 1410 Anesthesia Stop: 1430    Procedures:       ESOPHAGOGASTRODUODENOSCOPY (N/A )      COLONOSCOPY (N/A ) Diagnosis:       Gastroesophageal reflux disease, unspecified whether esophagitis present      Colon cancer screening      (Gastroesophageal reflux disease, unspecified whether esophagitis present [K21.9])      (Colon cancer screening [Z12.11])    Surgeons: Valery Mccarty MD Provider: Abel Kamara CRNA    Anesthesia Type: MAC ASA Status: 3          Anesthesia Type: MAC    Vitals  No vitals data found for the desired time range.          Post Anesthesia Care and Evaluation    Patient location during evaluation: bedside  Level of consciousness: sleepy but conscious  Pain score: 0  Pain management: adequate  Airway patency: patent  Anesthetic complications: No anesthetic complications  PONV Status: none  Cardiovascular status: acceptable and hemodynamically stable  Respiratory status: acceptable and spontaneous ventilation  Hydration status: acceptable

## 2021-08-12 LAB
LAB AP CASE REPORT: NORMAL
PATH REPORT.FINAL DX SPEC: NORMAL

## 2021-08-24 ENCOUNTER — OFFICE VISIT (OUTPATIENT)
Dept: GASTROENTEROLOGY | Facility: CLINIC | Age: 56
End: 2021-08-24

## 2021-08-24 VITALS
BODY MASS INDEX: 30.19 KG/M2 | WEIGHT: 170.4 LBS | HEART RATE: 59 BPM | SYSTOLIC BLOOD PRESSURE: 88 MMHG | HEIGHT: 63 IN | DIASTOLIC BLOOD PRESSURE: 62 MMHG

## 2021-08-24 DIAGNOSIS — B18.2 CHRONIC HEPATITIS C WITHOUT HEPATIC COMA (HCC): Primary | ICD-10-CM

## 2021-08-24 DIAGNOSIS — K21.9 GASTROESOPHAGEAL REFLUX DISEASE, UNSPECIFIED WHETHER ESOPHAGITIS PRESENT: ICD-10-CM

## 2021-08-24 PROCEDURE — 99214 OFFICE O/P EST MOD 30 MIN: CPT | Performed by: INTERNAL MEDICINE

## 2021-08-24 NOTE — PATIENT INSTRUCTIONS
Hepatitis C    Hepatitis C is a liver infection that is caused by a virus. Many people have no symptoms or only mild symptoms. Hepatitis C is contagious. This means that it can spread from person to person. Over time, the infection can lead to serious liver problems.  What are the causes?  This condition is caused by a virus. People can get this infection through:  · Contact with certain body fluids of someone who has the virus. This includes:  ? Blood.  ? Semen or vaginal fluids.  · Childbirth. A woman can pass the virus to her baby during birth.  · Blood or organ donations done in the [place] before 1992.  What increases the risk?  The following things may make you more likely to get this condition:  · Having contact with needles or syringes that have the virus on them. This may happen while:  ? Getting a treatment that involves putting thin needles through your skin (acupuncture).  ? Getting a tattoo.  ? Getting a body piercing.  ? Injecting drugs.  · Having sex with someone who has the virus. The virus can be passed through vaginal, oral, or anal sex.  · Getting treatment to filter your blood (kidney dialysis).  · Having HIV or AIDS.  · Having a job that involves contact with blood or certain other body fluids, such as in health care.  What are the signs or symptoms?  Symptoms of this condition include:  · Tiredness (fatigue).  · Not wanting to eat as much as normal (loss of appetite).  · Feeling like you may vomit (nauseous).  · Vomiting.  · Pain in your belly (abdomen).  · Dark yellow pee (urine).  · Your skin or the white parts of your eyes turning yellow (jaundice).  · Itchy skin.  · Light-colored or tan poop (stool).  · Joint pain.  · Bleeding and bruising that happen often.  · Fluid building up in your stomach (ascites).  Often, there are no symptoms.  How is this treated?  Treatment may depend on how bad your condition is, how long it has lasted, and whether you have liver damage. More testing may  be done to figure out the best treatment. Treatment may include:  · Taking antiviral medicines and other medicines.  · Having follow-up treatments every 6-12 months for infections or other liver problems.  · Getting a new liver from a donor (liver transplant).  Follow these instructions at home:  Medicines  · Take over-the-counter and prescription medicines only as told by your doctor.  · If you were prescribed an antiviral medicine, take it as told by your doctor. Do not stop using the antiviral even if you start to feel better.  · Do not take any new medicines unless your doctor says that this is okay. This includes over-the-counter medicines and supplements.  Activity  · Rest as needed.  · Do not have sex until your doctor says this is okay.  · Return to your normal activities as told by your doctor. Ask your doctor what activities are safe for you.  · Ask your doctor when you may go back to school or work.  Eating and drinking    · Eat a balanced diet. Eat plenty of:  ? Fruits and vegetables.  ? Whole grains.  ? Low-fat (lean) meats or non-meat proteins, such as beans or tofu.  · Drink enough fluids to keep your pee pale yellow.  · Do not drink alcohol.  General instructions  · Do not share toothbrushes, nail clippers, or razors.  · Wash your hands often with soap and water for at least 20 seconds. If you do not have soap and water, use hand .  · Cover any cuts or open sores on your skin.  · Avoid swimming or using hot tubs if you have open sores or wounds.  · Keep all follow-up visits as told by your doctor. This is important. You may need follow-up visits every 6-12 months.  How is this prevented?  · Wash your hands often with soap and water for at least 20 seconds.  · Do not share needles or syringes.  · Use a condom every time you have vaginal, oral, or anal sex. Be sure to use it the right way each time.  ? Both females and males should wear condoms.  ? Condoms should be kept in place from the  start to the end of sexual activity.  ? Latex condoms should be used, if possible.  · Do not handle blood or body fluids without gloves or other protection.  · Avoid getting tattoos or piercings in places that are not clean.  Where to find more information  · Centers for Disease Control and Prevention: www.cdc.gov  Contact a doctor if:  · You have a fever.  · You have belly pain.  · Your pee is dark.  · Your poop is a light color or tan.  · You have joint pain.  Get help right away if:  · You feel more and more tired.  · You feel more and more weak.  · You do not feel like eating.  · You cannot eat or drink without vomiting.  · Your skin or the whites of your eyes turn yellow or turn more yellow than they were before.  · You bruise or bleed easily.  Summary  · Hepatitis C is a liver infection that is caused by a virus.  · Over time, the infection can lead to serious liver problems.  · The virus can be spread from person to person through blood, semen, or vaginal fluids.  · Do not take any new medicines unless your doctor says that this is okay.  This information is not intended to replace advice given to you by your health care provider. Make sure you discuss any questions you have with your health care provider.  Document Revised: 09/09/2020 Document Reviewed: 08/17/2020  Elsevier Patient Education © 2021 [employer]

## 2021-09-07 ENCOUNTER — TELEPHONE (OUTPATIENT)
Dept: GASTROENTEROLOGY | Facility: CLINIC | Age: 56
End: 2021-09-07

## 2021-09-07 NOTE — TELEPHONE ENCOUNTER
09/07/2021---- Called to make the patient aware that his Mavyret was approved. Approval dates are 09/03/2021-10/29/2021. Left message for patient to call office back

## 2021-09-30 ENCOUNTER — OFFICE VISIT (OUTPATIENT)
Dept: GASTROENTEROLOGY | Facility: CLINIC | Age: 56
End: 2021-09-30

## 2021-09-30 DIAGNOSIS — B18.2 CHRONIC HEPATITIS C WITHOUT HEPATIC COMA (HCC): Primary | ICD-10-CM

## 2021-09-30 PROCEDURE — 99442 PR PHYS/QHP TELEPHONE EVALUATION 11-20 MIN: CPT | Performed by: INTERNAL MEDICINE

## 2021-09-30 NOTE — PROGRESS NOTES
No chief complaint on file.      Subjective    Jaime Taveras is a 56 y.o. male.    History of Present Illness  Patient had a 15-minute telephone follow-up visit today.  He feels well currently.  GERD is well controlled.  No GI complaints at this time.  Started Mavyret last week.  Notes medication induced side effects.       The following portions of the patient's history were reviewed and updated as appropriate:   Past Medical History:   Diagnosis Date   • CAD (coronary artery disease)    • COPD (chronic obstructive pulmonary disease) (CMS/HCC)    • Degenerative joint disease    • Fahr's disease (CMS/HCC)    • GERD (gastroesophageal reflux disease)    • Hemoptysis    • Hepatitis C    • Hypocalcemia    • Infectious viral hepatitis     Hepatitis C   • Pseudohypoparathyroidism    • Psoriasis      Past Surgical History:   Procedure Laterality Date   • COLONOSCOPY N/A 8/9/2021    Procedure: COLONOSCOPY;  Surgeon: Valery Mccarty MD;  Location: [facility] ENDOSCOPY;  Service: Gastroenterology;  Laterality: N/A;   • ENDOSCOPY N/A 8/9/2021    Procedure: ESOPHAGOGASTRODUODENOSCOPY;  Surgeon: Valery Mccarty MD;  Location: [facility] ENDOSCOPY;  Service: Gastroenterology;  Laterality: N/A;   • FOOT SURGERY     • FRACTURE SURGERY Right     foot   • HAND SURGERY     • UPPER GASTROINTESTINAL ENDOSCOPY  08/09/2021     Family History   Problem Relation Age of Onset   • Alcohol abuse Mother    • Cirrhosis Father    • Hepatitis Father    • Alcohol abuse Father    • Alcohol abuse Other    • Colon cancer Neg Hx    • Colon polyps Neg Hx        Prior to Admission medications    Medication Sig Start Date End Date Taking? Authorizing Provider   calcitriol (ROCALTROL) 0.25 MCG capsule Take 0.25 mcg by mouth three times daily     ProviderYu MD   calcium carbonate (TUMS) 500 MG chewable tablet Chew 1 tablet Daily.    ProviderYu MD   clobetasol (TEMOVATE) 0.05 % ointment Apply  topically 2 (Two) Times a Day.     ProviderYu MD   diclofenac (VOLTAREN) 50 MG EC tablet As Needed. 3/25/17   Yu Rosales MD   omeprazole (priLOSEC) 20 MG capsule Take 20 mg by mouth daily    ProviderYu MD     Allergies   Allergen Reactions   • Bactrim [Sulfamethoxazole-Trimethoprim]    • Cephalexin      Nausea/Diarrhea   • Sulfa Antibiotics GI Intolerance     Vomiting, Diarrhea, Discoloration Of Stool     Social History     Socioeconomic History   • Marital status: Single     Spouse name: Not on file   • Number of children: Not on file   • Years of education: Not on file   • Highest education level: Not on file   Tobacco Use   • Smoking status: Former Smoker     Packs/day: 1.00     Years: 30.00     Pack years: 30.00     Types: Cigarettes   • Smokeless tobacco: Former User     Types: Chew   Vaping Use   • Vaping Use: Former   • Substances: Nicotine   • Devices: Refillable tank   Substance and Sexual Activity   • Alcohol use: Not Currently   • Drug use: No   • Sexual activity: Defer       Review of Systems  Review of Systems   Constitutional: Negative for chills, fatigue, fever and unexpected weight change.   HENT: Negative for congestion, ear discharge, hearing loss, nosebleeds and sore throat.    Eyes: Negative for pain, discharge and redness.   Respiratory: Negative for cough, chest tightness, shortness of breath and wheezing.    Cardiovascular: Negative for chest pain and palpitations.   Gastrointestinal: Negative for abdominal distention, abdominal pain, blood in stool, constipation, diarrhea, nausea and vomiting.   Endocrine: Negative for cold intolerance, polydipsia, polyphagia and polyuria.   Genitourinary: Negative for dysuria, flank pain, frequency, hematuria and urgency.   Musculoskeletal: Negative for arthralgias, back pain, joint swelling and myalgias.   Skin: Negative for color change, pallor and rash.   Neurological: Negative for tremors, seizures, syncope, weakness and headaches.   Hematological:  Negative for adenopathy. Does not bruise/bleed easily.   Psychiatric/Behavioral: Negative for behavioral problems, confusion, dysphoric mood, hallucinations and suicidal ideas. The patient is not nervous/anxious.         There were no vitals taken for this visit.    Objective    Physical Exam telephone visit  Admission on 08/09/2021, Discharged on 08/09/2021   Component Date Value Ref Range Status   • Case Report 08/09/2021    Final                    Value:Surgical Pathology Report                         Case: [id number]                                  Authorizing Provider:  Valery Mccarty MD      Collected:           08/09/2021 02:21 PM          Ordering Location:     [facility]             Received:            08/10/2021 06:54 AM                                 [place] [facility]                                                     Pathologist:           Dominguez Spicer MD                                                           Specimens:   1) - Gastric, Antrum, JA                                                                            2) - Esophagus, EGJ   JA                                                                            3) - Large Intestine, Right / Ascending Colon, polyp   JA                                 • Final Diagnosis 08/09/2021    Final                    Value:This result contains rich text formatting which cannot be displayed here.     Assessment/Plan      1. Chronic hepatitis C without hepatic coma (CMS/HCC)     1.  Chronic hepatitis C,   continue treatment with Mavyret for 7 more weeks.  Repeat labs in next visit  2.  Longstanding GERD, symptoms are well controlled with PPI.  Continue PPI and antireflux lifestyle.    3.  Colorectal cancer screening, repeat colonoscopy in 5 years.  4.    Winter's esophagus, repeat EGD next year .  Continue PPI and antireflux lifestyle.  5.  Obesity, recommend exercise and diet control.  6.  History of alcohol and IV drug usage, off  currently.  Continue alcohol and IV drug abstinence.      Orders placed during this encounter include:  Orders Placed This Encounter   Procedures   • HCV RT-PCR,Quant(Non-Graph)     Standing Status:   Future     Standing Expiration Date:   9/30/2022     Order Specific Question:   Release to patient     Answer:   Immediate   • Hepatic Function Panel     Order Specific Question:   Release to patient     Answer:   Immediate       * Surgery not found *    Review and/or summary of lab tests, radiology, procedures, medications. Review and summary of old records and obtaining of history. The risks and benefits of my recommendations, as well as other treatment options were discussed with the patient today. Questions were answered.    No orders of the defined types were placed in this encounter.      Follow-up: No follow-ups on file.               Results for orders placed or performed during the hospital encounter of 08/09/21   Tissue Pathology Exam    Specimen: A: Gastric, Antrum; Tissue    B: Esophagus; Tissue    C: Large Intestine, Right / Ascending Colon; Tissue   Result Value Ref Range    Case Report       Surgical Pathology Report                         Case: [id number]                                  Authorizing Provider:  Valery Mccarty MD      Collected:           08/09/2021 02:21 PM          Ordering Location:     [facility]             Received:            08/10/2021 06:54 AM                                 [place] [facility]                                                     Pathologist:           Dominguze Spicer MD                                                           Specimens:   1) - Gastric, Antrum, JA                                                                            2) - Esophagus, EGJ   JA                                                                            3) - Large Intestine, Right / Ascending Colon, polyp   JA                                  Final Diagnosis        SEE SCANNED REPORT       Results for orders placed or performed in visit on 08/06/21   COVID-19, [facility] IN-HOUSE, NP SWAB IN TRANSPORT MEDIA 8-10 HR TAT - Swab, Nasopharynx    Specimen: Nasopharynx; Swab   Result Value Ref Range    COVID19 Not Detected Not Detected - Ref. Range   Results for orders placed or performed in visit on 07/30/21   Comprehensive Metabolic Panel    Specimen: Blood   Result Value Ref Range    Glucose 89 65 - 99 mg/dL    BUN 15 6 - 20 mg/dL    Creatinine 0.85 0.76 - 1.27 mg/dL    Sodium 131 (L) 136 - 145 mmol/L    Potassium 4.1 3.5 - 5.2 mmol/L    Chloride 99 98 - 107 mmol/L    CO2 25.0 22.0 - 29.0 mmol/L    Calcium 9.1 8.6 - 10.5 mg/dL    Total Protein 6.7 6.0 - 8.5 g/dL    Albumin 4.20 3.50 - 5.20 g/dL    ALT (SGPT) 32 1 - 41 U/L    AST (SGOT) 25 1 - 40 U/L    Alkaline Phosphatase 53 39 - 117 U/L    Total Bilirubin 0.5 0.0 - 1.2 mg/dL    eGFR Non African Amer 94 >60 mL/min/1.73    Globulin 2.5 gm/dL    A/G Ratio 1.7 g/dL    BUN/Creatinine Ratio 17.6 7.0 - 25.0    Anion Gap 7.0 5.0 - 15.0 mmol/L   Results for orders placed or performed in visit on 07/26/21   HCV RNA Qual RFX Genotype    Specimen: Blood   Result Value Ref Range    HCV RNA JERMAINE Qualitative Positive (A) Negative    Hepatitis C Genotype 1a     Please note Comment    HCV FibroSURE    Specimen: Blood   Result Value Ref Range    Fibrosis Score 0.25 (H) 0.00 - 0.21    Fibrosis Stage F0-F1     Necroinflammat Activity Score 0.20 (H) 0.00 - 0.17    Necroinflammat Activity Grade A0-A1     Alpha 2-Macroglobulins, Qn 251 110 - 276 mg/dL    Haptoglobin 122 29 - 370 mg/dL    Apolipoprotein A-1 154 101 - 178 mg/dL    Total Bilirubin 0.4 0.0 - 1.2 mg/dL    GGT 20 0 - 65 IU/L    ALT (SGPT) 39 0 - 55 IU/L    HCV Qual Interp Comment     Fibrosis Scoring: Comment     Necroinflamm Activity Scoring: Comment     Limitations: Comment     Comment Comment    Results for orders placed or performed during the hospital encounter of 07/09/20   CBC Auto  Differential    Specimen: Arm, Right; Blood   Result Value Ref Range    WBC 8.85 3.40 - 10.80 10*3/mm3    RBC 4.67 4.14 - 5.80 10*6/mm3    Hemoglobin 14.5 13.0 - 17.7 g/dL    Hematocrit 42.6 37.5 - 51.0 %    MCV 91.2 79.0 - 97.0 fL    MCH 31.0 26.6 - 33.0 pg    MCHC 34.0 31.5 - 35.7 g/dL    RDW 13.5 12.3 - 15.4 %    RDW-SD 45.3 37.0 - 54.0 fl    MPV 10.8 6.0 - 12.0 fL    Platelets 229 140 - 450 10*3/mm3    Neutrophil % 70.3 42.7 - 76.0 %    Lymphocyte % 19.4 (L) 19.6 - 45.3 %    Monocyte % 8.9 5.0 - 12.0 %    Eosinophil % 0.3 0.3 - 6.2 %    Basophil % 0.6 0.0 - 1.5 %    Immature Grans % 0.5 0.0 - 0.5 %    Neutrophils, Absolute 6.22 1.70 - 7.00 10*3/mm3    Lymphocytes, Absolute 1.72 0.70 - 3.10 10*3/mm3    Monocytes, Absolute 0.79 0.10 - 0.90 10*3/mm3    Eosinophils, Absolute 0.03 0.00 - 0.40 10*3/mm3    Basophils, Absolute 0.05 0.00 - 0.20 10*3/mm3    Immature Grans, Absolute 0.04 0.00 - 0.05 10*3/mm3    nRBC 0.0 0.0 - 0.2 /100 WBC   Urine Drug Screen - Urine, Clean Catch    Specimen: Urine, Clean Catch   Result Value Ref Range    THC, Screen, Urine Negative Negative    Phencyclidine (PCP), Urine Negative Negative    Cocaine Screen, Urine Negative Negative    Methamphetamine, Ur Positive (A) Negative    Opiate Screen Negative Negative    Amphetamine Screen, Urine Positive (A) Negative    Benzodiazepine Screen, Urine Negative Negative    Tricyclic Antidepressants Screen Negative Negative    Methadone Screen, Urine Negative Negative    Barbiturates Screen, Urine Negative Negative    Oxycodone Screen, Urine Negative Negative    Propoxyphene Screen Negative Negative    Buprenorphine, Screen, Urine Negative Negative   TSH    Specimen: Blood   Result Value Ref Range    TSH 7.680 (H) 0.270 - 4.200 uIU/mL   T4, Free    Specimen: Blood   Result Value Ref Range    Free T4 1.06 0.93 - 1.70 ng/dL   Ethanol    Specimen: Blood   Result Value Ref Range    Ethanol % <0.010 %   Comprehensive Metabolic Panel    Specimen: Blood    Result Value Ref Range    Glucose 169 (H) 65 - 99 mg/dL    BUN 8 6 - 20 mg/dL    Creatinine 0.85 0.76 - 1.27 mg/dL    Sodium 139 136 - 145 mmol/L    Potassium 2.8 (L) 3.5 - 5.2 mmol/L    Chloride 98 98 - 107 mmol/L    CO2 24.0 22.0 - 29.0 mmol/L    Calcium 8.9 8.6 - 10.5 mg/dL    Total Protein 7.5 6.0 - 8.5 g/dL    Albumin 4.40 3.50 - 5.20 g/dL    ALT (SGPT) 50 (H) 1 - 41 U/L    AST (SGOT) 43 (H) 1 - 40 U/L    Alkaline Phosphatase 59 39 - 117 U/L    Total Bilirubin 0.4 0.0 - 1.2 mg/dL    eGFR Non African Amer 94 >60 mL/min/1.73    Globulin 3.1 gm/dL    A/G Ratio 1.4 g/dL    BUN/Creatinine Ratio 9.4 7.0 - 25.0    Anion Gap 17.0 (H) 5.0 - 15.0 mmol/L   Results for orders placed or performed during the hospital encounter of 01/08/17   Green Top (Gel)   Result Value Ref Range    Extra Tube Hold for add-ons.      *Note: Due to a large number of results and/or encounters for the requested time period, some results have not been displayed. A complete set of results can be found in Results Review.         This document has been electronically signed by Valery Mccarty MD on September 30, 2021 12:09 CDT

## (undated) DEVICE — Device: Brand: DEFENDO AIR/WATER/SUCTION AND BIOPSY VALVE

## (undated) DEVICE — MASK,OXYGEN,MED CONC,ADLT,7' TUB, UC: Brand: PENDING

## (undated) DEVICE — SURGICAL PROCEDURE PACK LOWER EXTREMITY LOURDES HOSP

## (undated) DEVICE — GLOVE SURG SZ 75 L12IN FNGR THK87MIL DK GRN LTX FREE ISOLEX

## (undated) DEVICE — SUTURE ETHLN SZ 4-0 L18IN NONABSORBABLE BLK L19MM PS-2 3/8 1667H

## (undated) DEVICE — ZIMMER® STERILE DISPOSABLE TOURNIQUET CUFF WITH PLC, DUAL PORT, SINGLE BLADDER, 18 IN. (46 CM)

## (undated) DEVICE — BANDAGE COMPR SGL LAYERED CLP CLSR WHT RED BLK E 162FT LEN

## (undated) DEVICE — TBG SMPL FLTR LINE NASL 02/C02 A/ BX/100

## (undated) DEVICE — BIT DRL DIA2.2MM CROSSLOCK MOD TY DVR ANAT VOLAR PLATING

## (undated) DEVICE — YANKAUER,BULB TIP WITH VENT: Brand: ARGYLE

## (undated) DEVICE — C-ARM: Brand: UNBRANDED

## (undated) DEVICE — SUTURE ETHLN SZ 3-0 L18IN NONABSORBABLE BLK FS-1 L24MM 3/8 663H

## (undated) DEVICE — SOLUTION IV IRRIG POUR BRL 0.9% SODIUM CHL 2F7124

## (undated) DEVICE — DISCONTINUED NO SUB IDED TG GLOVE SURG SENSICARE ALOE LT LF PF ST GRN SZ 8

## (undated) DEVICE — ENDOGATOR AUXILIARY WATER JET CONNECTOR: Brand: ENDOGATOR

## (undated) DEVICE — SINGLE-USE BIOPSY FORCEPS: Brand: RADIAL JAW 4

## (undated) DEVICE — BITEBLOCK ENDO W/STRAP 60F A/ LF DISP

## (undated) DEVICE — GLOVE SURG SZ 75 L12IN FNGR THK94MIL TRNSLUC YEL LTX

## (undated) DEVICE — GLOVE SURG SZ 85 L12IN FNGR THK87MIL DK GRN LTX FREE ISOLEX

## (undated) DEVICE — TOWEL,OR,DSP,ST,BLUE,DLX,4/PK,20PK/CS: Brand: MEDLINE

## (undated) DEVICE — GLOVE SURG SZ 8 L12IN FNGR THK94MIL TRNSLUC YEL LTX HYDRGEL

## (undated) DEVICE — SUTURE SZ 0 27IN 5/8 CIR UR-6  TAPER PT VIOLET ABSRB VICRYL J603H

## (undated) DEVICE — SENSR O2 OXIMAX FNGR A/ 18IN NONSTR

## (undated) DEVICE — THE CHANNEL CLEANING BRUSH IS A NYLON FLEXI BRUSH ATTACHED TO A FLEXIBLE PLASTIC SHEATH DESIGNED TO SAFELY REMOVE DEBRIS FROM FLEXIBLE ENDOSCOPES.